# Patient Record
Sex: MALE | Race: BLACK OR AFRICAN AMERICAN | Employment: UNEMPLOYED | URBAN - METROPOLITAN AREA
[De-identification: names, ages, dates, MRNs, and addresses within clinical notes are randomized per-mention and may not be internally consistent; named-entity substitution may affect disease eponyms.]

---

## 2020-05-20 ENCOUNTER — APPOINTMENT (OUTPATIENT)
Dept: PHYSICAL THERAPY | Age: 50
End: 2020-05-20

## 2020-08-19 PROBLEM — K52.9 GASTROENTERITIS: Status: ACTIVE | Noted: 2020-08-19

## 2020-08-19 PROBLEM — R05.9 COUGH: Status: ACTIVE | Noted: 2020-08-19

## 2020-08-19 PROBLEM — R50.9 FEVER: Status: ACTIVE | Noted: 2020-08-19

## 2020-08-19 PROBLEM — R06.02 SOB (SHORTNESS OF BREATH): Status: ACTIVE | Noted: 2020-08-19

## 2020-08-19 PROBLEM — U07.1 COVID-19: Status: ACTIVE | Noted: 2020-08-19

## 2021-09-24 ENCOUNTER — HOSPITAL ENCOUNTER (EMERGENCY)
Age: 51
Discharge: HOME | End: 2021-09-24
Attending: EMERGENCY MEDICINE

## 2021-09-24 VITALS
DIASTOLIC BLOOD PRESSURE: 89 MMHG | TEMPERATURE: 98.1 F | HEART RATE: 86 BPM | OXYGEN SATURATION: 96 % | BODY MASS INDEX: 33.93 KG/M2 | WEIGHT: 237 LBS | RESPIRATION RATE: 16 BRPM | SYSTOLIC BLOOD PRESSURE: 158 MMHG | HEIGHT: 70 IN

## 2021-09-24 DIAGNOSIS — I10 HYPERTENSION, UNSPECIFIED TYPE: Primary | ICD-10-CM

## 2021-09-24 DIAGNOSIS — R51.9 NONINTRACTABLE HEADACHE, UNSPECIFIED CHRONICITY PATTERN, UNSPECIFIED HEADACHE TYPE: ICD-10-CM

## 2021-09-24 LAB
ALBUMIN SERPL-MCNC: 3.7 G/DL (ref 3.5–5)
ALBUMIN/GLOB SERPL: 0.9 {RATIO} (ref 1.2–3.5)
ALP SERPL-CCNC: 101 U/L (ref 50–136)
ALT SERPL-CCNC: 24 U/L (ref 12–65)
ANION GAP SERPL CALC-SCNC: 7 MMOL/L (ref 7–16)
AST SERPL-CCNC: 17 U/L (ref 15–37)
ATRIAL RATE: 73 BPM
BASOPHILS # BLD: 0 K/UL (ref 0–0.2)
BASOPHILS NFR BLD: 1 % (ref 0–2)
BILIRUB SERPL-MCNC: 0.8 MG/DL (ref 0.2–1.1)
BUN SERPL-MCNC: 15 MG/DL (ref 6–23)
CALCIUM SERPL-MCNC: 9.2 MG/DL (ref 8.3–10.4)
CALCULATED P AXIS, ECG09: 28 DEGREES
CALCULATED R AXIS, ECG10: 43 DEGREES
CALCULATED T AXIS, ECG11: 22 DEGREES
CHLORIDE SERPL-SCNC: 107 MMOL/L (ref 98–107)
CO2 SERPL-SCNC: 29 MMOL/L (ref 21–32)
CREAT SERPL-MCNC: 1.08 MG/DL (ref 0.8–1.5)
DIAGNOSIS, 93000: NORMAL
DIFFERENTIAL METHOD BLD: ABNORMAL
EOSINOPHIL # BLD: 0.1 K/UL (ref 0–0.8)
EOSINOPHIL NFR BLD: 1 % (ref 0.5–7.8)
ERYTHROCYTE [DISTWIDTH] IN BLOOD BY AUTOMATED COUNT: 12.4 % (ref 11.9–14.6)
GLOBULIN SER CALC-MCNC: 4.3 G/DL (ref 2.3–3.5)
GLUCOSE SERPL-MCNC: 104 MG/DL (ref 65–100)
HCT VFR BLD AUTO: 37.8 % (ref 41.1–50.3)
HGB BLD-MCNC: 12.5 G/DL (ref 13.6–17.2)
IMM GRANULOCYTES # BLD AUTO: 0 K/UL (ref 0–0.5)
IMM GRANULOCYTES NFR BLD AUTO: 1 % (ref 0–5)
LYMPHOCYTES # BLD: 1.6 K/UL (ref 0.5–4.6)
LYMPHOCYTES NFR BLD: 25 % (ref 13–44)
MCH RBC QN AUTO: 31.6 PG (ref 26.1–32.9)
MCHC RBC AUTO-ENTMCNC: 33.1 G/DL (ref 31.4–35)
MCV RBC AUTO: 95.5 FL (ref 79.6–97.8)
MONOCYTES # BLD: 0.5 K/UL (ref 0.1–1.3)
MONOCYTES NFR BLD: 7 % (ref 4–12)
NEUTS SEG # BLD: 4.3 K/UL (ref 1.7–8.2)
NEUTS SEG NFR BLD: 66 % (ref 43–78)
NRBC # BLD: 0 K/UL (ref 0–0.2)
P-R INTERVAL, ECG05: 132 MS
PLATELET # BLD AUTO: 249 K/UL (ref 150–450)
PMV BLD AUTO: 11.5 FL (ref 9.4–12.3)
POTASSIUM SERPL-SCNC: 3.4 MMOL/L (ref 3.5–5.1)
PROT SERPL-MCNC: 8 G/DL (ref 6.3–8.2)
Q-T INTERVAL, ECG07: 372 MS
QRS DURATION, ECG06: 88 MS
QTC CALCULATION (BEZET), ECG08: 409 MS
RBC # BLD AUTO: 3.96 M/UL (ref 4.23–5.6)
SODIUM SERPL-SCNC: 143 MMOL/L (ref 136–145)
VENTRICULAR RATE, ECG03: 73 BPM
WBC # BLD AUTO: 6.5 K/UL (ref 4.3–11.1)

## 2021-09-24 PROCEDURE — 74011250636 HC RX REV CODE- 250/636: Performed by: EMERGENCY MEDICINE

## 2021-09-24 PROCEDURE — 96374 THER/PROPH/DIAG INJ IV PUSH: CPT

## 2021-09-24 PROCEDURE — 93005 ELECTROCARDIOGRAM TRACING: CPT | Performed by: EMERGENCY MEDICINE

## 2021-09-24 PROCEDURE — 80053 COMPREHEN METABOLIC PANEL: CPT

## 2021-09-24 PROCEDURE — 74011000250 HC RX REV CODE- 250: Performed by: EMERGENCY MEDICINE

## 2021-09-24 PROCEDURE — 99285 EMERGENCY DEPT VISIT HI MDM: CPT

## 2021-09-24 PROCEDURE — 96375 TX/PRO/DX INJ NEW DRUG ADDON: CPT

## 2021-09-24 PROCEDURE — 85025 COMPLETE CBC W/AUTO DIFF WBC: CPT

## 2021-09-24 RX ORDER — SODIUM CHLORIDE 0.9 % (FLUSH) 0.9 %
5-40 SYRINGE (ML) INJECTION EVERY 8 HOURS
Status: DISCONTINUED | OUTPATIENT
Start: 2021-09-24 | End: 2021-09-24 | Stop reason: HOSPADM

## 2021-09-24 RX ORDER — SODIUM CHLORIDE 0.9 % (FLUSH) 0.9 %
5-40 SYRINGE (ML) INJECTION AS NEEDED
Status: DISCONTINUED | OUTPATIENT
Start: 2021-09-24 | End: 2021-09-24 | Stop reason: HOSPADM

## 2021-09-24 RX ORDER — LABETALOL HYDROCHLORIDE 5 MG/ML
20 INJECTION, SOLUTION INTRAVENOUS
Status: COMPLETED | OUTPATIENT
Start: 2021-09-24 | End: 2021-09-24

## 2021-09-24 RX ORDER — HYDRALAZINE HYDROCHLORIDE 20 MG/ML
10 INJECTION INTRAMUSCULAR; INTRAVENOUS
Status: COMPLETED | OUTPATIENT
Start: 2021-09-24 | End: 2021-09-24

## 2021-09-24 RX ORDER — HYDRALAZINE HYDROCHLORIDE 20 MG/ML
10 INJECTION INTRAMUSCULAR; INTRAVENOUS
Status: DISCONTINUED | OUTPATIENT
Start: 2021-09-24 | End: 2021-09-24

## 2021-09-24 RX ORDER — LOSARTAN POTASSIUM 50 MG/1
50 TABLET ORAL DAILY
Qty: 30 TABLET | Refills: 0 | Status: SHIPPED | OUTPATIENT
Start: 2021-09-24 | End: 2021-10-08 | Stop reason: SDUPTHER

## 2021-09-24 RX ADMIN — LABETALOL HYDROCHLORIDE 20 MG: 5 INJECTION INTRAVENOUS at 12:59

## 2021-09-24 RX ADMIN — HYDRALAZINE HYDROCHLORIDE 10 MG: 20 INJECTION INTRAMUSCULAR; INTRAVENOUS at 14:18

## 2021-09-24 RX ADMIN — Medication 5 ML: at 13:00

## 2021-09-24 NOTE — ED PROVIDER NOTES
28-year-old -American male with no history of hypertension presents with intermittent headache over the past 2 to 3 weeks. Headache is frontal and bilateral.  Described as throbbing. No aggravating or alleviating factors. No chest pain, vision changes, numbness or weakness. Denies nausea and vomiting. No fever or neck pain. The history is provided by the patient. Headache   Pertinent negatives include no fever, no shortness of breath, no nausea and no vomiting. Past Medical History:   Diagnosis Date    Hypertension        History reviewed. No pertinent surgical history. History reviewed. No pertinent family history. Social History     Socioeconomic History    Marital status:      Spouse name: Not on file    Number of children: Not on file    Years of education: Not on file    Highest education level: Not on file   Occupational History    Not on file   Tobacco Use    Smoking status: Never Smoker    Smokeless tobacco: Never Used   Substance and Sexual Activity    Alcohol use: No    Drug use: No    Sexual activity: Not on file   Other Topics Concern    Not on file   Social History Narrative    Not on file     Social Determinants of Health     Financial Resource Strain:     Difficulty of Paying Living Expenses:    Food Insecurity:     Worried About Running Out of Food in the Last Year:     [address] in the Last Year:    Transportation Needs:     Lack of Transportation (Medical):      Lack of Transportation (Non-Medical):    Physical Activity:     Days of Exercise per Week:     Minutes of Exercise per Session:    Stress:     Feeling of Stress :    Social Connections:     Frequency of Communication with Friends and Family:     Frequency of Social Gatherings with Friends and Family:     Attends Jew Services:     Active Member of Clubs or Organizations:     Attends Club or Organization Meetings:     Marital Status:    Intimate Partner Violence:  Fear of Current or Ex-Partner:     Emotionally Abused:     Physically Abused:     Sexually Abused: ALLERGIES: Patient has no known allergies. Review of Systems   Constitutional: Negative for fever. HENT: Negative for congestion. Respiratory: Negative for cough and shortness of breath. Cardiovascular: Negative for chest pain. Gastrointestinal: Negative for abdominal pain, nausea and vomiting. Musculoskeletal: Negative for back pain and neck pain. Skin: Negative for rash. Neurological: Positive for headaches. All other systems reviewed and are negative. Vitals:    09/24/21 1213 09/24/21 1217 09/24/21 1222   BP: (!) 197/123 (!) 180/120 (!) 190/113   Pulse: 89  82   Resp: 16     Temp: 98.1 °F (36.7 °C)     SpO2: 99%  95%   Weight: 107.5 kg (237 lb)     Height: 5' 10\" (1.778 m)              Physical Exam  Vitals and nursing note reviewed. Constitutional:       General: He is not in acute distress. Appearance: Normal appearance. He is not toxic-appearing. HENT:      Head: Normocephalic and atraumatic. Nose: Nose normal.      Mouth/Throat:      Mouth: Mucous membranes are moist.      Pharynx: Oropharynx is clear. Eyes:      Extraocular Movements: Extraocular movements intact. Conjunctiva/sclera: Conjunctivae normal.      Pupils: Pupils are equal, round, and reactive to light. Cardiovascular:      Rate and Rhythm: Normal rate and regular rhythm. Heart sounds: No murmur heard. Pulmonary:      Effort: Pulmonary effort is normal.      Breath sounds: Normal breath sounds. Abdominal:      General: There is no distension. Palpations: Abdomen is soft. Tenderness: There is no abdominal tenderness. There is no guarding. Musculoskeletal:         General: No swelling or tenderness. Normal range of motion. Cervical back: Normal range of motion and neck supple. Skin:     General: Skin is dry.    Neurological:      General: No focal deficit present. Mental Status: He is alert and oriented to person, place, and time. Cranial Nerves: No cranial nerve deficit. Sensory: No sensory deficit. Motor: No weakness. Coordination: Coordination normal.   Psychiatric:         Mood and Affect: Mood normal.         Behavior: Behavior normal.          MDM  Number of Diagnoses or Management Options  Hypertension, unspecified type  Nonintractable headache, unspecified chronicity pattern, unspecified headache type  Diagnosis management comments: EKG normal sinus rhythm rate 73 no diagnostic ST changes. Lab work is unremarkable. Patient treated initially with labetalol with temporary improvement in blood pressure. Headache also improved. Blood pressure again increased and was treated with hydralazine. Blood pressure again has improved. Patient has normal neuro exam and headache improved with blood pressure control therefore I do not feel imaging is necessary. Patient is comfortable and appears safe for discharge home. Will start on losartan for blood pressure. Patient has a list of physicians to follow-up with for primary care.        Amount and/or Complexity of Data Reviewed  Clinical lab tests: ordered and reviewed  Tests in the medicine section of CPT®: ordered and reviewed    Risk of Complications, Morbidity, and/or Mortality  Presenting problems: moderate  Diagnostic procedures: moderate  Management options: moderate           EKG    Date/Time: 9/24/2021 1:32 PM  Performed by: Humble Olivo MD  Authorized by: Humble Olivo MD     ECG reviewed by ED Physician in the absence of a cardiologist: yes    Previous ECG:     Previous ECG:  Unavailable  Interpretation:     Interpretation: abnormal    Rate:     ECG rate assessment: normal    Rhythm:     Rhythm: sinus rhythm    Ectopy:     Ectopy: none    Conduction:     Conduction: normal    ST segments:     ST segments:  Normal  T waves:     T waves: non-specific

## 2021-09-24 NOTE — ED NOTES
I have reviewed discharge instructions with the patient. The patient verbalized understanding. Patient left ED via Discharge Method: ambulatory to Home with self    Opportunity for questions and clarification provided. Patient given 1 scripts. To continue your aftercare when you leave the hospital, you may receive an automated call from our care team to check in on how you are doing. This is a free service and part of our promise to provide the best care and service to meet your aftercare needs.  If you have questions, or wish to unsubscribe from this service please call [phone]. Thank you for Choosing our [facility] Emergency Department.

## 2021-09-24 NOTE — PROGRESS NOTES
Visited with patient as no PCP listed in chart. Demographics on face sheet verified. Patient states no PCP and no insurance. Explained the Access [facility] program, [facility] and [facility] in detail and provided patient with resources. Contact information for [facility] given to patient and patient encouraged to follow up with her as soon as possible. Patient also given direct contact information for the [facility] CLINICS representative at [facility] and encouraged to call to get screened for financial assistance.

## 2021-09-24 NOTE — ED NOTES
No known issues with htn, has had headaches for last several weeks. Multiple BP reading in triage 190s/120s. Has taken ibuprofen at home for headaches with no improvement. Patient evaluated initially in triage. Rapid Medical Evaluation was conducted and necessary orders have been placed. I have performed a medical screening exam.  Care will now be transferred to the attending physician in the emergency department.   RAYA Reynoso 12:14 PM

## 2021-10-08 ENCOUNTER — HOSPITAL ENCOUNTER (EMERGENCY)
Age: 51
Discharge: HOME | End: 2021-10-08
Attending: EMERGENCY MEDICINE

## 2021-10-08 VITALS
HEART RATE: 80 BPM | WEIGHT: 239 LBS | HEIGHT: 72 IN | DIASTOLIC BLOOD PRESSURE: 103 MMHG | OXYGEN SATURATION: 96 % | SYSTOLIC BLOOD PRESSURE: 180 MMHG | RESPIRATION RATE: 16 BRPM | TEMPERATURE: 98.6 F | BODY MASS INDEX: 32.37 KG/M2

## 2021-10-08 DIAGNOSIS — I10 ESSENTIAL HYPERTENSION: Primary | ICD-10-CM

## 2021-10-08 PROCEDURE — 99282 EMERGENCY DEPT VISIT SF MDM: CPT

## 2021-10-08 RX ORDER — LOSARTAN POTASSIUM 100 MG/1
100 TABLET ORAL DAILY
Qty: 30 TABLET | Refills: 0 | Status: SHIPPED | OUTPATIENT
Start: 2021-10-08 | End: 2021-11-07

## 2021-10-08 NOTE — ED TRIAGE NOTES
Pt in [facility] was started on losartan 50mg 2 weeks ago. [facility] BP is still elevated. Denies n/v/d/cp/sob. [facility] has pcp appointment on 10/24/21.

## 2021-10-08 NOTE — ED PROVIDER NOTES
56-year-old male presents emergency department today with complaint of hypertension. Patient states that he was seen here on the 24th and prescribed losartan. He reports he has been compliant with medication but he is still having blood pressures of 100 systolic at home. He denies any chest pain, shortness of breath, dizziness, headache, blurred vision, or ankle swelling. The history is provided by the patient. Hypertension   This is a new problem. Pertinent negatives include no chest pain, no blurred vision, no headaches, no peripheral edema, no dizziness and no shortness of breath. Risk factors include obesity. Past Medical History:   Diagnosis Date    Hypertension        History reviewed. No pertinent surgical history. History reviewed. No pertinent family history. Social History     Socioeconomic History    Marital status:      Spouse name: Not on file    Number of children: Not on file    Years of education: Not on file    Highest education level: Not on file   Occupational History    Not on file   Tobacco Use    Smoking status: Never Smoker    Smokeless tobacco: Never Used   Substance and Sexual Activity    Alcohol use: No    Drug use: No    Sexual activity: Not on file   Other Topics Concern    Not on file   Social History Narrative    Not on file     Social Determinants of Health     Financial Resource Strain:     Difficulty of Paying Living Expenses:    Food Insecurity:     Worried About Running Out of Food in the Last Year:     [address] in the Last Year:    Transportation Needs:     Lack of Transportation (Medical):      Lack of Transportation (Non-Medical):    Physical Activity:     Days of Exercise per Week:     Minutes of Exercise per Session:    Stress:     Feeling of Stress :    Social Connections:     Frequency of Communication with Friends and Family:     Frequency of Social Gatherings with Friends and Family:     Attends Quaker Services:     Active Member of Clubs or Organizations:     Attends Club or Organization Meetings:     Marital Status:    Intimate Partner Violence:     Fear of Current or Ex-Partner:     Emotionally Abused:     Physically Abused:     Sexually Abused: ALLERGIES: Patient has no known allergies. Review of Systems   Constitutional: Negative for fatigue. Eyes: Negative for blurred vision. Respiratory: Negative for shortness of breath. Cardiovascular: Negative for chest pain. Neurological: Negative for dizziness and headaches. All other systems reviewed and are negative. Vitals:    10/08/21 1650   BP: (!) 180/103   Pulse: 80   Resp: 16   Temp: 98.6 °F (37 °C)   SpO2: 96%   Weight: 108.4 kg (239 lb)   Height: 6' (1.829 m)            Physical Exam  Vitals and nursing note reviewed. Constitutional:       General: He is not in acute distress. Appearance: Normal appearance. He is obese. He is not ill-appearing, toxic-appearing or diaphoretic. HENT:      Head: Normocephalic and atraumatic. Right Ear: External ear normal.      Left Ear: External ear normal.      Mouth/Throat:      Mouth: Mucous membranes are moist.      Pharynx: Oropharynx is clear. Eyes:      General: No scleral icterus. Extraocular Movements: Extraocular movements intact. Conjunctiva/sclera: Conjunctivae normal.   Cardiovascular:      Rate and Rhythm: Normal rate. Pulses: Normal pulses. Heart sounds: Normal heart sounds. Pulmonary:      Effort: Pulmonary effort is normal. No respiratory distress. Breath sounds: Normal breath sounds. Abdominal:      General: Abdomen is flat. There is no distension. Musculoskeletal:         General: Normal range of motion. Cervical back: Normal range of motion and neck supple. No rigidity. Right lower leg: No edema. Left lower leg: No edema. Skin:     General: Skin is warm and dry.       Capillary Refill: Capillary refill takes less than 2 seconds. Neurological:      General: No focal deficit present. Mental Status: He is alert and oriented to person, place, and time. Psychiatric:         Mood and Affect: Mood normal.         Behavior: Behavior normal.         Thought Content: Thought content normal.         Judgment: Judgment normal.          MDM  Number of Diagnoses or Management Options  Essential hypertension  Diagnosis management comments: 60-year-old male presents emergency department today with chief complaint of hypertension. Patient has been monitoring his blood pressure at home since being prescribed losartan on September 24. Patient reports he is continue to have blood pressures of about 180/110. Patient denies any associated symptoms. He is well-appearing on exam.  Patient had normal labs on September 24. No work-up indicated today. We will increase his losartan to 100 mg. Patient states he has a primary care appointment at the end of the month. I encouraged him to continue monitoring his blood pressure at home, improve his diet, exercise, and reduce sodium intake. I have discussed the results of all labs, procedures, radiographs, and/or treatments with the patient and available family members. Bryan De La Cruz is agreed upon by the patient and the patient is ready for discharge.  Questions about treatment in the ED and differential diagnosis of presenting condition were answered. Saumya Kaufman was given verbal discharge instructions including, but not limited to, importance of returning to the emergency department for any concern of worsening or continued symptoms.  Instructions were given to follow up with a primary care provider or specialist within 1-2 days. Remus Channel effects of medications, if prescribed, were discussed and patient was advised to refrain from significant physical activity until followed up by primary care physician and to not drive or operate heavy machinery after taking any sedating substances.      Sarah Mensah NP; 10/8/2021 @9:06 PM Voice dictation software was used during the making of  this note. This software is not perfect and grammatical and other typographical errors  may be present. This note has not been proofread for errors. Risk of Complications, Morbidity, and/or Mortality  Presenting problems: low  Diagnostic procedures: low  Management options: low    Patient Progress  Patient progress: improved    ED Course as of Oct 08 1701   Fri Oct 08, 2021   1700 Case discussed with Dr. Sourav Dumont. Suggest to increase patient's losartan dose and have him follow-up with primary care.     [BC]      ED Course User Index  [BC] Judah Horta NP       Procedures

## 2021-10-08 NOTE — ED NOTES
I have reviewed discharge instructions with the patient. The patient verbalized understanding. Patient left ED via Discharge Method: ambulatory to Home with self. Opportunity for questions and clarification provided. Patient given 1 scripts. To continue your aftercare when you leave the hospital, you may receive an automated call from our care team to check in on how you are doing. This is a free service and part of our promise to provide the best care and service to meet your aftercare needs.  If you have questions, or wish to unsubscribe from this service please call [phone]. Thank you for Choosing our [facility] Emergency Department.

## 2021-10-08 NOTE — DISCHARGE INSTRUCTIONS
Take medication as prescribed. Continue monitoring your blood pressure at home. Reduce sodium intake. Return to the ER for any new, worsening, or concerning symptoms.

## 2022-03-18 PROBLEM — R05.9 COUGH: Status: ACTIVE | Noted: 2020-08-19

## 2022-03-18 PROBLEM — R50.9 FEVER: Status: ACTIVE | Noted: 2020-08-19

## 2022-03-19 PROBLEM — R06.02 SOB (SHORTNESS OF BREATH): Status: ACTIVE | Noted: 2020-08-19

## 2022-03-19 PROBLEM — K52.9 GASTROENTERITIS: Status: ACTIVE | Noted: 2020-08-19

## 2022-03-19 PROBLEM — U07.1 COVID-19: Status: ACTIVE | Noted: 2020-08-19

## 2024-04-01 ENCOUNTER — APPOINTMENT (OUTPATIENT)
Dept: GENERAL RADIOLOGY | Age: 54
End: 2024-04-01
Payer: COMMERCIAL

## 2024-04-01 ENCOUNTER — TELEPHONE (OUTPATIENT)
Dept: ORTHOPEDIC SURGERY | Age: 54
End: 2024-04-01

## 2024-04-01 ENCOUNTER — HOSPITAL ENCOUNTER (EMERGENCY)
Age: 54
Discharge: HOME | End: 2024-04-01
Attending: STUDENT IN AN ORGANIZED HEALTH CARE EDUCATION/TRAINING PROGRAM
Payer: COMMERCIAL

## 2024-04-01 VITALS
WEIGHT: 240 LBS | TEMPERATURE: 98.1 F | OXYGEN SATURATION: 95 % | BODY MASS INDEX: 32.51 KG/M2 | HEIGHT: 72 IN | SYSTOLIC BLOOD PRESSURE: 150 MMHG | DIASTOLIC BLOOD PRESSURE: 74 MMHG | RESPIRATION RATE: 17 BRPM | HEART RATE: 95 BPM

## 2024-04-01 DIAGNOSIS — S62.616A CLOSED DISPLACED FRACTURE OF PROXIMAL PHALANX OF RIGHT LITTLE FINGER, INITIAL ENCOUNTER: ICD-10-CM

## 2024-04-01 DIAGNOSIS — S62.316A DISPLACED FRACTURE OF BASE OF FIFTH METACARPAL BONE, RIGHT HAND, INITIAL ENCOUNTER FOR CLOSED FRACTURE: Primary | ICD-10-CM

## 2024-04-01 PROCEDURE — 73090 X-RAY EXAM OF FOREARM: CPT

## 2024-04-01 PROCEDURE — 73130 X-RAY EXAM OF HAND: CPT

## 2024-04-01 PROCEDURE — 6360000002 HC RX W HCPCS: Performed by: STUDENT IN AN ORGANIZED HEALTH CARE EDUCATION/TRAINING PROGRAM

## 2024-04-01 PROCEDURE — 6370000000 HC RX 637 (ALT 250 FOR IP): Performed by: STUDENT IN AN ORGANIZED HEALTH CARE EDUCATION/TRAINING PROGRAM

## 2024-04-01 PROCEDURE — 90714 TD VACC NO PRESV 7 YRS+ IM: CPT | Performed by: STUDENT IN AN ORGANIZED HEALTH CARE EDUCATION/TRAINING PROGRAM

## 2024-04-01 PROCEDURE — 90471 IMMUNIZATION ADMIN: CPT | Performed by: STUDENT IN AN ORGANIZED HEALTH CARE EDUCATION/TRAINING PROGRAM

## 2024-04-01 PROCEDURE — 29125 APPL SHORT ARM SPLINT STATIC: CPT

## 2024-04-01 PROCEDURE — 99284 EMERGENCY DEPT VISIT MOD MDM: CPT

## 2024-04-01 PROCEDURE — 2500000003 HC RX 250 WO HCPCS: Performed by: STUDENT IN AN ORGANIZED HEALTH CARE EDUCATION/TRAINING PROGRAM

## 2024-04-01 RX ORDER — TETANUS AND DIPHTHERIA TOXOIDS ADSORBED 2; 2 [LF]/.5ML; [LF]/.5ML
0.5 INJECTION INTRAMUSCULAR
Status: COMPLETED | OUTPATIENT
Start: 2024-04-01 | End: 2024-04-01

## 2024-04-01 RX ORDER — LIDOCAINE HYDROCHLORIDE 10 MG/ML
5 INJECTION, SOLUTION INFILTRATION; PERINEURAL ONCE
Status: COMPLETED | OUTPATIENT
Start: 2024-04-01 | End: 2024-04-01

## 2024-04-01 RX ORDER — OXYCODONE HYDROCHLORIDE AND ACETAMINOPHEN 5; 325 MG/1; MG/1
1 TABLET ORAL EVERY 6 HOURS PRN
Qty: 12 TABLET | Refills: 0 | Status: SHIPPED | OUTPATIENT
Start: 2024-04-01 | End: 2024-04-04

## 2024-04-01 RX ORDER — OXYCODONE HYDROCHLORIDE 5 MG/1
5 TABLET ORAL
Status: COMPLETED | OUTPATIENT
Start: 2024-04-01 | End: 2024-04-01

## 2024-04-01 RX ADMIN — OXYCODONE 5 MG: 5 TABLET ORAL at 04:33

## 2024-04-01 RX ADMIN — TETANUS AND DIPHTHERIA TOXOIDS ADSORBED 0.5 ML: 2; 2 INJECTION INTRAMUSCULAR at 04:33

## 2024-04-01 RX ADMIN — LIDOCAINE HYDROCHLORIDE 5 ML: 10 INJECTION, SOLUTION EPIDURAL; INFILTRATION; INTRACAUDAL; PERINEURAL at 04:54

## 2024-04-01 ASSESSMENT — PAIN SCALES - GENERAL
PAINLEVEL_OUTOF10: 4
PAINLEVEL_OUTOF10: 10

## 2024-04-01 ASSESSMENT — PAIN - FUNCTIONAL ASSESSMENT: PAIN_FUNCTIONAL_ASSESSMENT: 0-10

## 2024-04-01 NOTE — TELEPHONE ENCOUNTER
I called to schedule ER follow up with Chloe Madera Np for hand and finger fracture. No answer, I left a voicemail for patient to call back.

## 2024-04-01 NOTE — ED PROVIDER NOTES
assessment: post-procedure neurovascularly intact  Patient tolerance: patient tolerated the procedure well with no immediate complications        MDM   Labs Reviewed - No data to display  Medications   oxyCODONE (ROXICODONE) immediate release tablet 5 mg (5 mg Oral Given 4/1/24 6133)   diptheria-tetanus toxoids (TDVAX) 2-2 LF/0.5ML injection 0.5 mL (0.5 mLs IntraMUSCular Given 4/1/24 1743)   lidocaine 1 % injection 5 mL (5 mLs IntraDERmal Given 4/1/24 9959)     XR HAND RIGHT (MIN 3 VIEWS)   Final Result   Spiral fracture of the fifth metacarpal. Comminuted fracture of the   diaphysis of the fifth proximal phalanx with angulation.         XR RADIUS ULNA RIGHT (2 VIEWS)   Final Result   No fracture or dislocation.      Hypertrophic changes at the olecranon.      Anterior forearm soft tissue swelling.         XR HAND RIGHT (MIN 3 VIEWS)    (Results Pending)     Voice dictation software was used during the making of this note.  This software is not perfect and grammatical and other typographical errors may be present.  This note has not been completely proofread for errors.     Nakul Lassiter MD  04/01/24 7371

## 2024-04-01 NOTE — DISCHARGE INSTRUCTIONS
Apply ice wrapped in a towel to the hand for 20 minutes every few hours. Keep the hand elevated when possible. You will need to follow up with the Hand Surgery clinic. Call their office this morning to set up an appointment. Return to the ER if any new or worsening symptoms.

## 2024-04-01 NOTE — ED TRIAGE NOTES
Pt presents to the ER with steady gait.  Pt accompianed by spouse.  Pt and/or family reports right hand pain and swelling after a trailer landed on it just PTA.  CMS intact, pt with significant swelling noted.

## 2024-04-02 ENCOUNTER — OFFICE VISIT (OUTPATIENT)
Dept: ORTHOPEDIC SURGERY | Age: 54
End: 2024-04-02

## 2024-04-02 DIAGNOSIS — S62.316A DISPLACED FRACTURE OF BASE OF FIFTH METACARPAL BONE, RIGHT HAND, INITIAL ENCOUNTER FOR CLOSED FRACTURE: Primary | ICD-10-CM

## 2024-04-02 DIAGNOSIS — S62.616A CLOSED DISPLACED FRACTURE OF PROXIMAL PHALANX OF RIGHT LITTLE FINGER, INITIAL ENCOUNTER: ICD-10-CM

## 2024-04-02 DIAGNOSIS — S62.617A DISPLACED FRACTURE OF PROXIMAL PHALANX OF LEFT LITTLE FINGER, INITIAL ENCOUNTER FOR CLOSED FRACTURE: ICD-10-CM

## 2024-04-02 PROCEDURE — 99204 OFFICE O/P NEW MOD 45 MIN: CPT | Performed by: NURSE PRACTITIONER

## 2024-04-02 NOTE — PROGRESS NOTES
Patient was prescribed a Boxer Splint for the right hand. The top portion of the boxer splint is bent slightly to an angle that allows fingers to be in a relaxed position. The straps are opened to allow the hand to rest in the splint with the fourth and fifth fingers in the top portion of the splint. The top portion strap is secured around the fourth and fifth fingers. The strap around the wrist is then secured followed by the middle strap that is secured between the first finger and thumbPatient read and signed documenting they understand and agree to [facility]'s current DME return policy.

## 2024-04-02 NOTE — PROGRESS NOTES
Orthopaedic Hand Clinic Note    Name: Messi Dumont  YOB: 1970  Gender: male  MRN: [id number]      CC: Patient referred for evaluation of right hand pain    HPI: Messi Dumont is a 53 y.o. male right hand dominant with a chief complaint of right hand pain.  He is a  for [employer].  He reports yesterday April 1, 2024 at approximately 3 AM he was lowering his trailer.  He said the handle spun out of control hitting his right hand.  He was seen at [facility] emergency room.  He was x-rayed.  He underwent a reduction of the right small finger.  He was emily taped and placed in an ulnar gutter splint.  He is taking oxycodone for pain.  He is accompanied by his wife.  He reports a medical history positive for hypertension..    ROS/Meds/PSH/PMH/FH/SH: I personally reviewed the patients standard intake form.  Pertinents are discussed in the HPI    Physical Examination:  General: Awake and alert.  HEENT: Normocephalic, atraumatic  CV/Pulm: Breathing even and unlabored  Skin: No obvious rashes noted.  Lymphatic: No obvious evidence of lymphedema or lymphadenopathy    Musculoskeletal Exam:  Examination on the right upper extremity demonstrates cap refill < 5 seconds in all fingers  Patient has swelling and ecchymosis to the right hand.  His ring and small finger are emily taped with medical tape.  He has an abrasion to the ulnar aspect of his right forearm.  He has decreased range of motion secondary to pain.  He is tender palpation over the fifth metacarpal and the right small finger proximal phalanx.  He denies paresthesia.    Dr. Temple has examined the patient.    Imaging / Electrodiagnostic Tests:     X-rays include a 3 view right hand are independently reviewed and interpreted.  Patient has a fracture of the right fifth metacarpal.  He has a fracture of the proximal phalanx of the right small finger.    Dr. Temple has reviewed xrays.    Assessment:   1. Displaced fracture of base of

## 2024-04-02 NOTE — H&P (VIEW-ONLY)
Orthopaedic Hand Clinic Note    Name: Messi Dumont  YOB: 1970  Gender: male  MRN: [id number]      CC: Patient referred for evaluation of right hand pain    HPI: Messi Dumont is a 53 y.o. male right hand dominant with a chief complaint of right hand pain.  He is a  for country [employer].  He reports yesterday April 1, 2024 at approximately 3 AM he was lowering his trailer.  He said the handle spun out of control hitting his right hand.  He was seen at [facility] emergency room.  He was x-rayed.  He underwent a reduction of the right small finger.  He was emily taped and placed in an ulnar gutter splint.  He is taking oxycodone for pain.  He is accompanied by his wife.  He reports a medical history positive for hypertension..    ROS/Meds/PSH/PMH/FH/SH: I personally reviewed the patients standard intake form.  Pertinents are discussed in the HPI    Physical Examination:  General: Awake and alert.  HEENT: Normocephalic, atraumatic  CV/Pulm: Breathing even and unlabored  Skin: No obvious rashes noted.  Lymphatic: No obvious evidence of lymphedema or lymphadenopathy    Musculoskeletal Exam:  Examination on the right upper extremity demonstrates cap refill < 5 seconds in all fingers  Patient has swelling and ecchymosis to the right hand.  His ring and small finger are emily taped with medical tape.  He has an abrasion to the ulnar aspect of his right forearm.  He has decreased range of motion secondary to pain.  He is tender palpation over the fifth metacarpal and the right small finger proximal phalanx.  He denies paresthesia.    Dr. Temple has examined the patient.    Imaging / Electrodiagnostic Tests:     X-rays include a 3 view right hand are independently reviewed and interpreted.  Patient has a fracture of the right fifth metacarpal.  He has a fracture of the proximal phalanx of the right small finger.    Dr. Temple has reviewed xrays.    Assessment:   1. Displaced fracture of base of

## 2024-04-03 ENCOUNTER — ANESTHESIA EVENT (OUTPATIENT)
Dept: SURGERY | Age: 54
End: 2024-04-03
Payer: COMMERCIAL

## 2024-04-03 DIAGNOSIS — S62.316A DISPLACED FRACTURE OF BASE OF FIFTH METACARPAL BONE, RIGHT HAND, INITIAL ENCOUNTER FOR CLOSED FRACTURE: Primary | ICD-10-CM

## 2024-04-03 DIAGNOSIS — S62.616A CLOSED DISPLACED FRACTURE OF PROXIMAL PHALANX OF RIGHT LITTLE FINGER, INITIAL ENCOUNTER: ICD-10-CM

## 2024-04-03 RX ORDER — AMLODIPINE BESYLATE 10 MG/1
10 TABLET ORAL DAILY
COMMUNITY
Start: 2024-02-14

## 2024-04-03 RX ORDER — LOSARTAN POTASSIUM AND HYDROCHLOROTHIAZIDE 25; 100 MG/1; MG/1
TABLET ORAL DAILY
COMMUNITY
Start: 2024-02-14

## 2024-04-03 RX ORDER — TIRZEPATIDE 2.5 MG/.5ML
INJECTION, SOLUTION SUBCUTANEOUS WEEKLY
COMMUNITY
Start: 2024-02-14

## 2024-04-03 RX ORDER — ERGOCALCIFEROL 1.25 MG/1
CAPSULE ORAL WEEKLY
COMMUNITY
Start: 2024-02-16

## 2024-04-03 RX ORDER — TADALAFIL 5 MG/1
TABLET ORAL PRN
COMMUNITY
Start: 2024-03-29

## 2024-04-03 NOTE — PERIOP NOTE
Patient verified name and .  Order for consent NOT found in EHR at time of PAT visit. Unable to verify case posting against order; surgery verified by patient.    Type 1B surgery, phone assessment complete.  Orders not received.  Labs per surgeon: none ordered  Labs per anesthesia protocol: K+, SQBS s/h for DOS    Patient answered medical/surgical history questions at their best of ability. All prior to admission medications documented in EPIC.    Patient instructed to continue taking all prescription medications up to the day of surgery but to take only the following medications the day of surgery according to anesthesia guidelines with a small sip of water: oxycodone-acetaminophen if needed- patient states he is taking, amlodipine.  Patient informed that all vitamins and supplements should be held 7 days prior to surgery and NSAIDS 5 days prior to surgery. Prescription meds to hold: mounjaro now for surgery, last dose 3/24/24, hold cialis now for surgery    Patient instructed on the following:  > Patient is instructed to have clear liquids only on the day prior to procedure and to be NPO after midnight, unless otherwise indicated, including gum, mints, and ice chips.   > Arrive at [facility] Entrance, time of arrival to be called the day before by 1700  > NPO after midnight, unless otherwise indicated, including gum, mints, and ice chips  > Responsible adult must drive patient to the hospital, stay during surgery, and patient will need supervision 24 hours after anesthesia  > Use non moisturizing soap in shower the night before surgery and on the morning of surgery  > All piercings must be removed prior to arrival.    > Leave all valuables (money and jewelry) at home but bring insurance card and ID on DOS.   > You may be required to pay a deductible or co-pay on the day of your procedure. You can pre-pay by calling [phone] if your surgery is at the [facility] or [phone] if your surgery is at the [facility]

## 2024-04-03 NOTE — PERIOP NOTE
Preop department called to notify patient of arrival time for scheduled procedure. Instructions given to   - Arrive at [facility] Entrance [address].  - Remain NPO after midnight, unless otherwise indicated, including gum, mints, and ice chips.   - Have a responsible adult to drive patient to the hospital, stay during surgery, and patient will need supervision 24 hours after anesthesia.   - Use antibacterial soap in shower the night before surgery and on the morning of surgery.       Was patient contacted: yes wife  Voicemail left: yes-pts phone   Numbers contacted: [phone]   Arrival time: 1000

## 2024-04-04 ENCOUNTER — HOSPITAL ENCOUNTER (OUTPATIENT)
Age: 54
Setting detail: OUTPATIENT SURGERY
Discharge: HOME | End: 2024-04-04
Attending: ORTHOPAEDIC SURGERY | Admitting: ORTHOPAEDIC SURGERY
Payer: COMMERCIAL

## 2024-04-04 ENCOUNTER — APPOINTMENT (OUTPATIENT)
Dept: GENERAL RADIOLOGY | Age: 54
End: 2024-04-04
Attending: ORTHOPAEDIC SURGERY
Payer: COMMERCIAL

## 2024-04-04 ENCOUNTER — ANESTHESIA (OUTPATIENT)
Dept: SURGERY | Age: 54
End: 2024-04-04
Payer: COMMERCIAL

## 2024-04-04 VITALS
OXYGEN SATURATION: 97 % | DIASTOLIC BLOOD PRESSURE: 87 MMHG | WEIGHT: 240 LBS | BODY MASS INDEX: 32.51 KG/M2 | RESPIRATION RATE: 21 BRPM | SYSTOLIC BLOOD PRESSURE: 149 MMHG | HEIGHT: 72 IN | TEMPERATURE: 97.6 F | HEART RATE: 76 BPM

## 2024-04-04 LAB — POTASSIUM BLD-SCNC: 3.5 MMOL/L (ref 3.5–5.1)

## 2024-04-04 PROCEDURE — 2709999900 HC NON-CHARGEABLE SUPPLY: Performed by: ORTHOPAEDIC SURGERY

## 2024-04-04 PROCEDURE — 6360000002 HC RX W HCPCS: Performed by: NURSE ANESTHETIST, CERTIFIED REGISTERED

## 2024-04-04 PROCEDURE — 7100000011 HC PHASE II RECOVERY - ADDTL 15 MIN: Performed by: ORTHOPAEDIC SURGERY

## 2024-04-04 PROCEDURE — 6360000002 HC RX W HCPCS: Performed by: ANESTHESIOLOGY

## 2024-04-04 PROCEDURE — C1769 GUIDE WIRE: HCPCS | Performed by: ORTHOPAEDIC SURGERY

## 2024-04-04 PROCEDURE — 7100000001 HC PACU RECOVERY - ADDTL 15 MIN: Performed by: ORTHOPAEDIC SURGERY

## 2024-04-04 PROCEDURE — 3600000004 HC SURGERY LEVEL 4 BASE: Performed by: ORTHOPAEDIC SURGERY

## 2024-04-04 PROCEDURE — 6360000002 HC RX W HCPCS: Performed by: NURSE PRACTITIONER

## 2024-04-04 PROCEDURE — 26735 TREAT FINGER FRACTURE EACH: CPT | Performed by: ORTHOPAEDIC SURGERY

## 2024-04-04 PROCEDURE — C1713 ANCHOR/SCREW BN/BN,TIS/BN: HCPCS | Performed by: ORTHOPAEDIC SURGERY

## 2024-04-04 PROCEDURE — 6370000000 HC RX 637 (ALT 250 FOR IP): Performed by: ANESTHESIOLOGY

## 2024-04-04 PROCEDURE — 2580000003 HC RX 258: Performed by: ANESTHESIOLOGY

## 2024-04-04 PROCEDURE — 84132 ASSAY OF SERUM POTASSIUM: CPT

## 2024-04-04 PROCEDURE — 26746 TREAT FINGER FRACTURE EACH: CPT | Performed by: ORTHOPAEDIC SURGERY

## 2024-04-04 PROCEDURE — 3700000000 HC ANESTHESIA ATTENDED CARE: Performed by: ORTHOPAEDIC SURGERY

## 2024-04-04 PROCEDURE — 64415 NJX AA&/STRD BRCH PLXS IMG: CPT | Performed by: ANESTHESIOLOGY

## 2024-04-04 PROCEDURE — 6360000002 HC RX W HCPCS

## 2024-04-04 PROCEDURE — 3600000014 HC SURGERY LEVEL 4 ADDTL 15MIN: Performed by: ORTHOPAEDIC SURGERY

## 2024-04-04 PROCEDURE — 7100000000 HC PACU RECOVERY - FIRST 15 MIN: Performed by: ORTHOPAEDIC SURGERY

## 2024-04-04 PROCEDURE — 2500000003 HC RX 250 WO HCPCS: Performed by: NURSE ANESTHETIST, CERTIFIED REGISTERED

## 2024-04-04 PROCEDURE — 3700000001 HC ADD 15 MINUTES (ANESTHESIA): Performed by: ORTHOPAEDIC SURGERY

## 2024-04-04 PROCEDURE — 2580000003 HC RX 258: Performed by: NURSE ANESTHETIST, CERTIFIED REGISTERED

## 2024-04-04 PROCEDURE — 7100000010 HC PHASE II RECOVERY - FIRST 15 MIN: Performed by: ORTHOPAEDIC SURGERY

## 2024-04-04 DEVICE — SCREW BNE L8MM DIA2MM CORT S STL ST LOK FULL THRD T6: Type: IMPLANTABLE DEVICE | Site: HAND | Status: FUNCTIONAL

## 2024-04-04 DEVICE — IMPLANTABLE DEVICE: Type: IMPLANTABLE DEVICE | Site: HAND | Status: FUNCTIONAL

## 2024-04-04 DEVICE — SCREW BNE L10MM DIA2MM CORT S STL ST LOK FULL THRD T6: Type: IMPLANTABLE DEVICE | Site: HAND | Status: FUNCTIONAL

## 2024-04-04 DEVICE — SCREW BNE L13MM DIA2MM HND S STL ST LOK VAR ANG T6 STARDRV: Type: IMPLANTABLE DEVICE | Site: HAND | Status: FUNCTIONAL

## 2024-04-04 RX ORDER — IPRATROPIUM BROMIDE AND ALBUTEROL SULFATE 2.5; .5 MG/3ML; MG/3ML
1 SOLUTION RESPIRATORY (INHALATION)
Status: DISCONTINUED | OUTPATIENT
Start: 2024-04-04 | End: 2024-04-04 | Stop reason: HOSPADM

## 2024-04-04 RX ORDER — SODIUM CHLORIDE 0.9 % (FLUSH) 0.9 %
5-40 SYRINGE (ML) INJECTION EVERY 12 HOURS SCHEDULED
Status: DISCONTINUED | OUTPATIENT
Start: 2024-04-04 | End: 2024-04-04 | Stop reason: HOSPADM

## 2024-04-04 RX ORDER — MIDAZOLAM HYDROCHLORIDE 2 MG/2ML
2 INJECTION, SOLUTION INTRAMUSCULAR; INTRAVENOUS
Status: COMPLETED | OUTPATIENT
Start: 2024-04-04 | End: 2024-04-04

## 2024-04-04 RX ORDER — LIDOCAINE HYDROCHLORIDE 20 MG/ML
INJECTION, SOLUTION EPIDURAL; INFILTRATION; INTRACAUDAL; PERINEURAL PRN
Status: DISCONTINUED | OUTPATIENT
Start: 2024-04-04 | End: 2024-04-04 | Stop reason: SDUPTHER

## 2024-04-04 RX ORDER — ONDANSETRON 2 MG/ML
4 INJECTION INTRAMUSCULAR; INTRAVENOUS
Status: DISCONTINUED | OUTPATIENT
Start: 2024-04-04 | End: 2024-04-04 | Stop reason: HOSPADM

## 2024-04-04 RX ORDER — SODIUM CHLORIDE 0.9 % (FLUSH) 0.9 %
5-40 SYRINGE (ML) INJECTION PRN
Status: DISCONTINUED | OUTPATIENT
Start: 2024-04-04 | End: 2024-04-04 | Stop reason: HOSPADM

## 2024-04-04 RX ORDER — SODIUM CHLORIDE 9 MG/ML
INJECTION, SOLUTION INTRAVENOUS PRN
Status: DISCONTINUED | OUTPATIENT
Start: 2024-04-04 | End: 2024-04-04 | Stop reason: HOSPADM

## 2024-04-04 RX ORDER — HALOPERIDOL 5 MG/ML
1 INJECTION INTRAMUSCULAR
Status: DISCONTINUED | OUTPATIENT
Start: 2024-04-04 | End: 2024-04-04 | Stop reason: HOSPADM

## 2024-04-04 RX ORDER — HYDROMORPHONE HYDROCHLORIDE 2 MG/ML
0.5 INJECTION, SOLUTION INTRAMUSCULAR; INTRAVENOUS; SUBCUTANEOUS EVERY 10 MIN PRN
Status: DISCONTINUED | OUTPATIENT
Start: 2024-04-04 | End: 2024-04-04 | Stop reason: HOSPADM

## 2024-04-04 RX ORDER — ROPIVACAINE HYDROCHLORIDE 5 MG/ML
INJECTION, SOLUTION EPIDURAL; INFILTRATION; PERINEURAL
Status: DISCONTINUED | OUTPATIENT
Start: 2024-04-04 | End: 2024-04-04 | Stop reason: SDUPTHER

## 2024-04-04 RX ORDER — LIDOCAINE HYDROCHLORIDE 10 MG/ML
1 INJECTION, SOLUTION INFILTRATION; PERINEURAL
Status: DISCONTINUED | OUTPATIENT
Start: 2024-04-04 | End: 2024-04-04 | Stop reason: HOSPADM

## 2024-04-04 RX ORDER — NALOXONE HYDROCHLORIDE 0.4 MG/ML
INJECTION, SOLUTION INTRAMUSCULAR; INTRAVENOUS; SUBCUTANEOUS PRN
Status: DISCONTINUED | OUTPATIENT
Start: 2024-04-04 | End: 2024-04-04 | Stop reason: HOSPADM

## 2024-04-04 RX ORDER — PROPOFOL 10 MG/ML
INJECTION, EMULSION INTRAVENOUS PRN
Status: DISCONTINUED | OUTPATIENT
Start: 2024-04-04 | End: 2024-04-04 | Stop reason: SDUPTHER

## 2024-04-04 RX ORDER — ONDANSETRON 4 MG/1
4 TABLET, FILM COATED ORAL EVERY 8 HOURS PRN
Qty: 20 TABLET | Refills: 0 | Status: SHIPPED | OUTPATIENT
Start: 2024-04-04

## 2024-04-04 RX ORDER — ACETAMINOPHEN 500 MG
1000 TABLET ORAL ONCE
Status: COMPLETED | OUTPATIENT
Start: 2024-04-04 | End: 2024-04-04

## 2024-04-04 RX ORDER — SODIUM CHLORIDE, SODIUM LACTATE, POTASSIUM CHLORIDE, CALCIUM CHLORIDE 600; 310; 30; 20 MG/100ML; MG/100ML; MG/100ML; MG/100ML
INJECTION, SOLUTION INTRAVENOUS CONTINUOUS
Status: DISCONTINUED | OUTPATIENT
Start: 2024-04-04 | End: 2024-04-04 | Stop reason: HOSPADM

## 2024-04-04 RX ORDER — OXYCODONE HYDROCHLORIDE 5 MG/1
5 TABLET ORAL EVERY 6 HOURS PRN
Qty: 20 TABLET | Refills: 0 | Status: SHIPPED | OUTPATIENT
Start: 2024-04-04 | End: 2024-04-09

## 2024-04-04 RX ORDER — FENTANYL CITRATE 50 UG/ML
50 INJECTION, SOLUTION INTRAMUSCULAR; INTRAVENOUS EVERY 5 MIN PRN
Status: DISCONTINUED | OUTPATIENT
Start: 2024-04-04 | End: 2024-04-04 | Stop reason: HOSPADM

## 2024-04-04 RX ORDER — OXYCODONE HYDROCHLORIDE 5 MG/1
5 TABLET ORAL
Status: DISCONTINUED | OUTPATIENT
Start: 2024-04-04 | End: 2024-04-04 | Stop reason: HOSPADM

## 2024-04-04 RX ORDER — SODIUM CHLORIDE, SODIUM LACTATE, POTASSIUM CHLORIDE, CALCIUM CHLORIDE 600; 310; 30; 20 MG/100ML; MG/100ML; MG/100ML; MG/100ML
INJECTION, SOLUTION INTRAVENOUS CONTINUOUS PRN
Status: DISCONTINUED | OUTPATIENT
Start: 2024-04-04 | End: 2024-04-04 | Stop reason: SDUPTHER

## 2024-04-04 RX ORDER — FENTANYL CITRATE 50 UG/ML
INJECTION, SOLUTION INTRAMUSCULAR; INTRAVENOUS
Status: COMPLETED
Start: 2024-04-04 | End: 2024-04-04

## 2024-04-04 RX ORDER — FENTANYL CITRATE 50 UG/ML
100 INJECTION, SOLUTION INTRAMUSCULAR; INTRAVENOUS ONCE
Status: DISCONTINUED | OUTPATIENT
Start: 2024-04-04 | End: 2024-04-04 | Stop reason: HOSPADM

## 2024-04-04 RX ADMIN — ROPIVACAINE HYDROCHLORIDE 30 ML: 5 INJECTION, SOLUTION EPIDURAL; INFILTRATION; PERINEURAL at 11:26

## 2024-04-04 RX ADMIN — SODIUM CHLORIDE, POTASSIUM CHLORIDE, SODIUM LACTATE AND CALCIUM CHLORIDE: 600; 310; 30; 20 INJECTION, SOLUTION INTRAVENOUS at 11:00

## 2024-04-04 RX ADMIN — Medication 2000 MG: at 12:07

## 2024-04-04 RX ADMIN — PROPOFOL 150 MCG/KG/MIN: 10 INJECTION, EMULSION INTRAVENOUS at 12:03

## 2024-04-04 RX ADMIN — PHENYLEPHRINE HYDROCHLORIDE 150 MCG: 0.1 INJECTION, SOLUTION INTRAVENOUS at 12:27

## 2024-04-04 RX ADMIN — FENTANYL CITRATE 100 MCG: 50 INJECTION INTRAMUSCULAR; INTRAVENOUS at 11:26

## 2024-04-04 RX ADMIN — SODIUM CHLORIDE, SODIUM LACTATE, POTASSIUM CHLORIDE, AND CALCIUM CHLORIDE: 600; 310; 30; 20 INJECTION, SOLUTION INTRAVENOUS at 11:50

## 2024-04-04 RX ADMIN — PROPOFOL 100 MG: 10 INJECTION, EMULSION INTRAVENOUS at 12:01

## 2024-04-04 RX ADMIN — LIDOCAINE HYDROCHLORIDE 100 MG: 20 INJECTION, SOLUTION EPIDURAL; INFILTRATION; INTRACAUDAL; PERINEURAL at 11:59

## 2024-04-04 RX ADMIN — SODIUM CHLORIDE, SODIUM LACTATE, POTASSIUM CHLORIDE, AND CALCIUM CHLORIDE: 600; 310; 30; 20 INJECTION, SOLUTION INTRAVENOUS at 11:15

## 2024-04-04 RX ADMIN — MIDAZOLAM 2 MG: 1 INJECTION INTRAMUSCULAR; INTRAVENOUS at 11:26

## 2024-04-04 RX ADMIN — ACETAMINOPHEN 1000 MG: 500 TABLET, FILM COATED ORAL at 11:30

## 2024-04-04 ASSESSMENT — PAIN SCALES - GENERAL
PAINLEVEL_OUTOF10: 0

## 2024-04-04 ASSESSMENT — PAIN - FUNCTIONAL ASSESSMENT: PAIN_FUNCTIONAL_ASSESSMENT: 0-10

## 2024-04-04 ASSESSMENT — PAIN DESCRIPTION - DESCRIPTORS: DESCRIPTORS: ACHING

## 2024-04-04 NOTE — ANESTHESIA PRE PROCEDURE
Department of Anesthesiology  Preprocedure Note       Name:  Messi Dumont   Age:  53 y.o.  :  1970                                          MRN:  [id number]         Date:  2024      Surgeon: Surgeon(s):  Bryson Temple MD    Procedure: Procedure(s):  FINGER CLOSED REDUCTION PINNING of the right small proximal phalanx  ORIF right 5th metacarpal fracture    Medications prior to admission:   Prior to Admission medications    Medication Sig Start Date End Date Taking? Authorizing Provider   losartan-hydroCHLOROthiazide (HYZAAR) 100-25 MG per tablet daily 24  Yes Provider, MD Shy   amLODIPine (NORVASC) 10 MG tablet 1 tablet daily 24  Yes Provider, MD Shy   vitamin D (ERGOCALCIFEROL) 1.25 MG (08950 UT) CAPS capsule once a week Takes on 24  Yes Provider, MD Shy   tadalafil (CIALIS) 5 MG tablet as needed 3/29/24  Yes Provider, MD Shy   MOUNJARO 2.5 MG/0.5ML SOPN SC injection once a week Takes on   last dose 3/24  Takes for weight loss 24  Yes Provider, MD Shy   Multiple Vitamin (MULTIVITAMIN ADULT PO) Take by mouth daily   Yes Provider, MD Shy   oxyCODONE (ROXICODONE) 5 MG immediate release tablet Take 1 tablet by mouth every 6 hours as needed for Pain for up to 5 days. Max Daily Amount: 20 mg 24  Chloe Madera APRN - CNP   ondansetron (ZOFRAN) 4 MG tablet Take 1 tablet by mouth every 8 hours as needed for Nausea or Vomiting 24   Chloe Madera APRN - VIET   oxyCODONE-acetaminophen (PERCOCET) 5-325 MG per tablet Take 1 tablet by mouth every 6 hours as needed for Pain for up to 3 days. Intended supply: 3 days. Take lowest dose possible to manage pain Max Daily Amount: 4 tablets 24  Nakul Lassiter MD       Current medications:    Current Facility-Administered Medications   Medication Dose Route Frequency Provider Last Rate Last Admin   • lidocaine 1 % injection 1 mL  1 mL IntraDERmal Once PRN

## 2024-04-04 NOTE — PROGRESS NOTES
visited patient in pre op, as requested.  Patient was calm and expressed a positive outlook.  Wife was at bedside and supportive.   provided pastoral presence, prayer, and empathetic listening.  Peace be with you,  Signed by  NEVIN HuDiv.   [phone]

## 2024-04-04 NOTE — DISCHARGE INSTRUCTIONS
Postoperative  Instructions:      Weightbearing or Lifting:  You  are  not  allowed  to  lift  any  weight  or  bear  any  weight  on  the  surgical  extremity  until  cleared  by  your  surgeon.      Dressing  instructions:    Keep  your  dressing  and/or  splint  clean  and  dry  at  all  times.    It  will  be  removed  at  your  first  post-operative  appointment or therapy apppointment.    Your  stitches  will  be  removed  at  this  visit.      Showering  Instructions:  May  shower  But keep surgical dressing clean and dry until removed as explained above.      Pain  Control:  - You  have  been  given  a  prescription  to  be  taken  as  directed  for  post-operative  pain  control.    In  addition,  elevate  the  operative  extremity  above  the  heart  at  all  times  to  prevent  swelling  and  throbbing  pain.   - If you develop constipation while taking narcotic pain medications (Norco, Hydrocodone, Percocet, Oxycodone, Dilaudid, Hydromorphone) take  over-the-counter  Colace,  100mg  by  mouth  twice  a  Day.     - Nausea  is  a  common  side  effect  of  many  pain  medications.  You  will  want  to  eat something  before  taking  your  pain  medicine  to  help  prevent  Nausea.  - If  you  are  taking  a  prescription  pain  medication  that  contains  acetaminophen,  we  recommend  that  you  do  not  take  additional  over  the  counter  acetaminophen  (Tylenol®).      Other  pain  relieving  options:   - Using  a  cold  pack  to  ice  the  affected  area  a  few  times  a  day  (15  to  20  minutes  at  a  time)  can  help  to  relieve  pain,  reduce  swelling  and  bruising.      - Elevation  of  the  affected  area  can  also  help  to  reduce  pain  and  swelling.      Did  you  receive  a  nerve  Block?  A  nerve  block  can  provide  pain  relief  for  one  hour  to  two  days  after  your  surgery.  As  long  as  the  nerve  block  is  working,  you  will  experience  little  or  no  sensation   in  the  area  the  surgeon  operated  on.        As  the  nerve  block  wears  off,  you  will  begin  to  experience  pain  or  discomfort.  It  is  very  important  that  you  begin  taking  your  prescribed  pain  medication  before  the  nerve  block  fully  wears  off. The first sign that the nerve block is wearing off is tingling in your fingers. Treating  your  pain  at  the  first  sign  of  the  block  wearing  off  will  ensure  your  pain  is  better  controlled  and  more  tolerable  when  full-sensation  returns.  Do  not  wait  until  the  pain  is  intolerable,  as  the  medicine  will  be  less  effective.  It  is  better  to  treat  pain  in  advance  than  to  try  and  catch  up.        General  Anesthesia or Sedation:      If  you  did  not  receive  a  nerve  block  during  your  surgery,  you  will  need  to  start  taking  your  pain  medication  shortly  after  your  surgery  and  should  continue  to  do  so  as  prescribed  by  your  surgeon.       Please contact us through my chart or call  [phone]  with any concern and ask to speak with Dr Temple team.    Concerning problems include:      -  Excessive  redness  of  the  incisions      -  Drainage  for  more  than  2  Days after surgery or any foul smelling drainage  -  Fever  of  more  than  101.5  F      Please  call  [phone]  if  you  do  not  receive  or  are  unsure  of  your  first  follow-up  appointment.    You  should  see  the  doctor  10-14  days  after  your  Surgery.    Thank you for choosing me and [facility] for your care. I will go above and beyond to ensure you receive the best care possible.    Bryson Rader Jr, MD, PhD

## 2024-04-04 NOTE — ANESTHESIA POSTPROCEDURE EVALUATION
Department of Anesthesiology  Postprocedure Note    Patient: Messi Dumont  MRN: [id number]  YOB: 1970  Date of evaluation: 4/4/2024    Procedure Summary       Date: 04/04/24 Room / Location: [facility] OP OR 07 / [facility]    Anesthesia Start: 1150 Anesthesia Stop: 1313    Procedures:       FINGER CLOSED REDUCTION PINNING of the right small proximal phalanx (Right: Hand)      ORIF right 5th metacarpal fracture (Right: Hand) Diagnosis:       Closed displaced fracture of proximal phalanx of right little finger, initial encounter      Closed disp fracture of base of fifth metacarpal bone of right hand      (Closed displaced fracture of proximal phalanx of right little finger, initial encounter [S62.670U])      (Closed disp fracture of base of fifth metacarpal bone of right hand [S62.316A])    Surgeons: Bryson Temple MD Responsible Provider: Atul Trujillo MD    Anesthesia Type: Not recorded ASA Status: 2            Anesthesia Type: No value filed.    Conrad Phase I: Conrad Score: 8    Conrad Phase II: Conrad Score: 9    Anesthesia Post Evaluation    Patient location during evaluation: PACU  Patient participation: complete - patient participated  Level of consciousness: awake and alert  Airway patency: patent  Nausea & Vomiting: no nausea and no vomiting  Cardiovascular status: hemodynamically stable  Respiratory status: acceptable, nonlabored ventilation and spontaneous ventilation  Hydration status: euvolemic  Comments: BP (!) 149/87   Pulse 76   Temp 97.6 °F (36.4 °C) (Temporal)   Resp 21   Ht 1.829 m (6')   Wt 108.9 kg (240 lb)   SpO2 97%   BMI 32.55 kg/m²     Multimodal analgesia pain management approach  Pain management: adequate and satisfactory to patient    No notable events documented.

## 2024-04-04 NOTE — ANESTHESIA PROCEDURE NOTES
Peripheral Block    Patient location during procedure: pre-op  Reason for block: post-op pain management and at surgeon's request  Start time: 4/4/2024 11:26 AM  End time: 4/4/2024 11:35 AM  Staffing  Performed: anesthesiologist   Anesthesiologist: Atul Trujillo MD  Performed by: Atul Trujillo MD  Authorized by: Atul Trujillo MD    Preanesthetic Checklist  Completed: patient identified, IV checked, site marked, risks and benefits discussed, surgical/procedural consents, equipment checked, pre-op evaluation, timeout performed, anesthesia consent given, oxygen available, monitors applied/VS acknowledged and blood product R/B/A discussed and consented  Peripheral Block   Patient position: sitting  Prep: ChloraPrep  Provider prep: mask and sterile gloves  Block type: Brachial plexus  Supraclavicular  Laterality: right  Injection technique: single-shot  Guidance: ultrasound guided  Local infiltration: lidocaine  Infiltration strength: 1 %  Local infiltration: lidocaine  Dose: 3 mL    Needle   Needle type: insulated echogenic nerve stimulator needle   Needle gauge: 18 G  Needle localization: ultrasound guidance  Needle length: 10 cm  Assessment   Injection assessment: negative aspiration for heme, no paresthesia on injection, local visualized surrounding nerve on ultrasound and no intravascular symptoms  Paresthesia pain: none  Slow fractionated injection: yes  Hemodynamics: stable  Outcomes: uncomplicated    Medications Administered  ropivacaine (NAROPIN) injection 0.5% - Perineural   30 mL - 4/4/2024 11:26:00 AM

## 2024-04-04 NOTE — INTERVAL H&P NOTE
H&P Update:  Messi Dumont was seen and examined.  History and physical has been reviewed. The patient has been examined. There have been significant clinical changes since the completion of the originally dated History and Physical. Ganglion cyst on the right wrist for a long time, he would like to have it drained today.    Bryson Temple MD  Orthopaedic Surgery  04/04/24  10:43 AM

## 2024-04-05 NOTE — OP NOTE
ORTHOPAEDIC SURGICAL NOTE        Messi Dumont male 53 y.o.  [id number]   4/4/2024     PRE-OP DIAGNOSIS: Pre-Op Diagnosis Codes:     * Closed displaced fracture of proximal phalanx of right little finger, initial encounter [S62.616A]     * Closed disp fracture of base of fifth metacarpal bone of right hand [S62.316A]  POST-OP DIAGNOSIS: Same  LATERALITY: Right  Right     PROCEDURES PERFORMED:   Open reduction internal fixation of right fifth metacarpal neck articular fracture at the MCP joint, open reduction to opposition of right proximal phalanx shaft fracture       SURGEON:   Bryson Temple Jr, MD, PhD     IMPLANTS:   Implant Name Type Inv. Item Serial No.  Lot No. LRB No. Used Action   PLATE BNE L41MM [id number] 2X6 H HND 316L S STL GUZMAN ANG ZUHAIR ROT - [id number]  PLATE BNE L41MM [id number] 2X6 H HND 316L S STL GUZMAN ANG ZUHAIR ROT  [employer]- [id number] Right 1 Implanted   SCREW BNE L13MM DIA2MM HND S STL ST ZUHAIR GUZMAN ANG T6 [employer]AR[employer] - [id number]  SCREW BNE L13MM DIA2MM HND S STL ST ZUHAIR GUZMAN ANG T6 STARDRV  DEPUY SYNTHES [facility]- [id number] Right 3 Implanted   SCREW BNE L10MM DIA2MM DADA S STL ST ZUHAIR FULL THRD T6 - [id number]  SCREW BNE L10MM DIA2MM DADA S STL ST ZUHAIR FULL THRD T6  [employer]- [id number] Right 2 Implanted   SCREW BNE L8MM DIA2MM DADA S STL ST ZUHAIR FULL THRD T6 - [id number]  SCREW BNE L8MM DIA2MM DADA S STL ST ZUHAIR FULL THRD T6  [employer]PU[employer]- [id number] Right 1 Implanted   SCREW BNE SHT THRD 2.5X32 MM 8 MM EDGAR HDLSS TI - [id number]  SCREW BNE SHT THRD 2.5X32 MM 8 MM EDGAR HDLSS TI  DEPUY [employer]-WD [id number] Right 1 Implanted      Procedure(s):  FINGER CLOSED REDUCTION PINNING of the right small proximal phalanx  ORIF right 5th metacarpal fracture   Surgeon(s):  Bryson Temple MD   Procedure(s):  FINGER CLOSED REDUCTION PINNING of the right small proximal phalanx  ORIF right 5th metacarpal fracture     ANESTHESIA: Regional     STAFF:    Circulator: Krishna Canales,

## 2024-04-09 ENCOUNTER — EVALUATION (OUTPATIENT)
Age: 54
End: 2024-04-09

## 2024-04-09 ENCOUNTER — TELEPHONE (OUTPATIENT)
Dept: ORTHOPEDIC SURGERY | Age: 54
End: 2024-04-09

## 2024-04-09 DIAGNOSIS — M25.60 DECREASED RANGE OF MOTION: ICD-10-CM

## 2024-04-09 DIAGNOSIS — S62.316A DISPLACED FRACTURE OF BASE OF FIFTH METACARPAL BONE, RIGHT HAND, INITIAL ENCOUNTER FOR CLOSED FRACTURE: ICD-10-CM

## 2024-04-09 DIAGNOSIS — S62.617A DISPLACED FRACTURE OF PROXIMAL PHALANX OF LEFT LITTLE FINGER, INITIAL ENCOUNTER FOR CLOSED FRACTURE: ICD-10-CM

## 2024-04-09 DIAGNOSIS — Z78.9 DECREASED ACTIVITIES OF DAILY LIVING (ADL): ICD-10-CM

## 2024-04-09 DIAGNOSIS — S62.616A CLOSED DISPLACED FRACTURE OF PROXIMAL PHALANX OF RIGHT LITTLE FINGER, INITIAL ENCOUNTER: ICD-10-CM

## 2024-04-09 DIAGNOSIS — S62.316A CLOSED DISPLACED FRACTURE OF BASE OF FIFTH METACARPAL BONE OF RIGHT HAND, INITIAL ENCOUNTER: ICD-10-CM

## 2024-04-09 DIAGNOSIS — S62.316A DISPLACED FRACTURE OF BASE OF FIFTH METACARPAL BONE, RIGHT HAND, INITIAL ENCOUNTER FOR CLOSED FRACTURE: Primary | ICD-10-CM

## 2024-04-09 DIAGNOSIS — M25.641 STIFFNESS OF FINGER JOINT OF RIGHT HAND: Primary | ICD-10-CM

## 2024-04-09 RX ORDER — OXYCODONE HYDROCHLORIDE 5 MG/1
5 TABLET ORAL EVERY 6 HOURS PRN
Qty: 20 TABLET | Refills: 0 | Status: SHIPPED | OUTPATIENT
Start: 2024-04-09 | End: 2024-04-14

## 2024-04-09 NOTE — PROGRESS NOTES
[facility] ORTHOPAED[facility]  [address] [place] [id number]  Dept: [phone]      Occupational Therapy Initial Assessment     Referring MD: Chloe Madera APRN - C*    Diagnosis:     ICD-10-CM    1. Stiffness of finger joint of right hand  M25.641       2. Decreased range of motion  M25.60       3. Decreased activities of daily living (ADL)  Z78.9       4. Closed displaced fracture of base of fifth metacarpal bone of right hand, initial encounter  S62.316A       5. Displaced fracture of proximal phalanx of left little finger, initial encounter for closed fracture  S62.617A            Surgery: Date 4/4/24     Therapy precautions: Fracture precautions    History of injury/onset : The patient is a 53 year old male s/p Open reduction internal fixation of right fifth metacarpal neck articular fracture at the MCP joint and open reduction of right proximal phalanx shaft fracture with screw       Payor: worker's comp  Billing pattern: Workers comp- substantial/midpoint time each CPT  Total Direct Treatment Time: 15 min                       Total In Office Time: 75 min  Modifier needed: No  Episode visit count:  1     Preferred Name:  Messi    PERTINENT MEDICAL HISTORY     PMHX & Meds:   Past Medical History:   Diagnosis Date    ED (erectile dysfunction)     Hypertension     medication    Obesity     patient stated taking mounjaro for wt loss   ,   Past Surgical History:   Procedure Laterality Date    FINGER CLOSED REDUCTION Right 4/4/2024    FINGER CLOSED REDUCTION PINNING of the right small proximal phalanx performed by Bryson Temple MD at [facility]    HAND SURGERY Right 4/4/2024    ORIF right 5th metacarpal fracture performed by Bryson Temple MD at [facility]      Medications. : Reviewed in chart  Allergies: No Known Allergies     SUBJECTIVE     Current Symptoms/Chief complaints:   Chief Complaint   Patient presents with    Hand Injury       Chief complaint/history of injury:     Number of

## 2024-04-09 NOTE — TELEPHONE ENCOUNTER
He is requesting a refill on hydrocodone. He says he needs a stronger dose, this is not controlling his pain. He does use [facility] @ [employer].

## 2024-04-11 ENCOUNTER — TELEPHONE (OUTPATIENT)
Dept: ORTHOPEDIC SURGERY | Age: 54
End: 2024-04-11

## 2024-04-11 DIAGNOSIS — S62.616A CLOSED DISPLACED FRACTURE OF PROXIMAL PHALANX OF RIGHT LITTLE FINGER, INITIAL ENCOUNTER: ICD-10-CM

## 2024-04-11 DIAGNOSIS — S62.316A DISPLACED FRACTURE OF BASE OF FIFTH METACARPAL BONE, RIGHT HAND, INITIAL ENCOUNTER FOR CLOSED FRACTURE: Primary | ICD-10-CM

## 2024-04-11 NOTE — TELEPHONE ENCOUNTER
Marley with [facility] says he will be seeing a certified hand therapist but because it is WC it will need to say PT on the order.

## 2024-04-12 ENCOUNTER — TREATMENT (OUTPATIENT)
Age: 54
End: 2024-04-12
Payer: COMMERCIAL

## 2024-04-12 DIAGNOSIS — M25.60 DECREASED RANGE OF MOTION: ICD-10-CM

## 2024-04-12 DIAGNOSIS — Z78.9 DECREASED ACTIVITIES OF DAILY LIVING (ADL): ICD-10-CM

## 2024-04-12 DIAGNOSIS — M25.641 STIFFNESS OF FINGER JOINT OF RIGHT HAND: Primary | ICD-10-CM

## 2024-04-12 PROCEDURE — 97010 HOT OR COLD PACKS THERAPY: CPT

## 2024-04-12 PROCEDURE — 97110 THERAPEUTIC EXERCISES: CPT

## 2024-04-12 PROCEDURE — 97763 ORTHC/PROSTC MGMT SBSQ ENC: CPT

## 2024-04-12 NOTE — PROGRESS NOTES
[facility] ORTHOPAED[facility]  [address] [place] [id number]  Dept: [phone]      Occupational Therapy Discharge     Referring MD: Chloe Madera APRN - C*    Diagnosis:     ICD-10-CM    1. Stiffness of finger joint of right hand  M25.641       2. Decreased range of motion  M25.60       3. Decreased activities of daily living (ADL)  Z78.9            Surgery: Date 4/4/24     Therapy precautions: Fracture precautions    History of injury/onset : The patient is a 53 year old male s/p Open reduction internal fixation of right fifth metacarpal neck articular fracture at the MCP joint and open reduction of right proximal phalanx shaft fracture with screw       Payor: worker's comp  Billing pattern: Workers comp- substantial/midpoint time each CPT  Total Direct Treatment Time: 45 min                       Total In Office Time: 55 min  Modifier needed: No  Episode visit count:  2     Preferred Name:  Messi    PERTINENT MEDICAL HISTORY     PMHX & Meds:   Past Medical History:   Diagnosis Date    ED (erectile dysfunction)     Hypertension     medication    Obesity     patient stated taking mounjaro for wt loss   ,   Past Surgical History:   Procedure Laterality Date    FINGER CLOSED REDUCTION Right 4/4/2024    FINGER CLOSED REDUCTION PINNING of the right small proximal phalanx performed by Bryson Temple MD at [facility]    HAND SURGERY Right 4/4/2024    ORIF right 5th metacarpal fracture performed by Bryson Temple MD at [facility]      Medications. : Reviewed in chart  Allergies: No Known Allergies     SUBJECTIVE     Current Symptoms/Chief complaints:   Chief Complaint   Patient presents with    Finger Injury       OBJECTIVE       Digital AROM:  IE IF LF RF SF   AROM    MCP    5°    AROM      PIP    75/85°    AROM      DIP    0/5°    Active flexion to DPC                    Hot Pack (59685) x 10 minutes to right hand prior to treatment for moist heat/tissue extensibility in preparation for treatment.

## 2024-04-17 ENCOUNTER — CLINICAL DOCUMENTATION (OUTPATIENT)
Dept: ORTHOPEDIC SURGERY | Age: 54
End: 2024-04-17

## 2024-04-17 ENCOUNTER — OFFICE VISIT (OUTPATIENT)
Dept: ORTHOPEDIC SURGERY | Age: 54
End: 2024-04-17
Payer: COMMERCIAL

## 2024-04-17 DIAGNOSIS — S62.316A DISPLACED FRACTURE OF BASE OF FIFTH METACARPAL BONE, RIGHT HAND, INITIAL ENCOUNTER FOR CLOSED FRACTURE: Primary | ICD-10-CM

## 2024-04-17 PROCEDURE — 99024 POSTOP FOLLOW-UP VISIT: CPT | Performed by: ORTHOPAEDIC SURGERY

## 2024-04-17 RX ORDER — TRAMADOL HYDROCHLORIDE 50 MG/1
50 TABLET ORAL EVERY 6 HOURS PRN
Qty: 20 TABLET | Refills: 0 | Status: SHIPPED | OUTPATIENT
Start: 2024-04-17 | End: 2024-04-22

## 2024-04-17 RX ORDER — AMITRIPTYLINE HYDROCHLORIDE 25 MG/1
25 TABLET, FILM COATED ORAL NIGHTLY
Qty: 30 TABLET | Refills: 0 | Status: SHIPPED | OUTPATIENT
Start: 2024-04-17 | End: 2024-05-17

## 2024-04-17 RX ORDER — METHYLPREDNISOLONE 4 MG/1
TABLET ORAL
Qty: 1 KIT | Refills: 0 | Status: SHIPPED | OUTPATIENT
Start: 2024-04-17

## 2024-04-17 NOTE — PROGRESS NOTES
Orthopaedic Hand Surgery Note    Name: Messi Dumont  Age: 53 y.o.  YOB: 1970  Gender: male  MRN: [id number]    Post Operative Visit: FINGER CLOSED REDUCTION PINNING of the right small proximal phalanx - Right and ORIF right 5th metacarpal fracture - Right    HPI: Patient is status post FINGER CLOSED REDUCTION PINNING of the right small proximal phalanx - Right and ORIF right 5th metacarpal fracture - Right on 4/4/2024. Patient reports moderate pain mostly at night that wakes him up, he is also reporting intermittent numbness and paresthesias in median distribution, this happens both at daytime and nighttime.    Physical Examination:  Well-healed surgical wounds. Sensation is intact in all fingers. Motor exam reveals no deficits.  Very limited small finger motion, patient has a positive carpal tunnel compression test.    Imaging:     right Hand XR: AP, Lateral, Oblique and Thumb CMC joint     Clinical Indication:  1. Displaced fracture of base of fifth metacarpal bone, right hand, initial encounter for closed fracture           Report: AP, lateral, oblique and thumb CMC joint hand XRs demonstrates fifth metacarpal and small finger proximal phalanx fracture status post plate and screw fixation as well as intramedullary screw fixation of the proximal phalanx and unchanged alignment from intraoperative x-rays    Impression: as above     Bryson Temple MD         Assessment:   1. Displaced fracture of base of fifth metacarpal bone, right hand, initial encounter for closed fracture         Status post FINGER CLOSED REDUCTION PINNING of the right small proximal phalanx - Right and ORIF right 5th metacarpal fracture - Right on 4/4/2024    Plan:  We discussed the post operative course and progression.  Continue therapy to work on active motion, passive motion and 4 weeks, I will prescribe a Medrol Dosepak to help with the carpal tunnel symptoms, it appears that swelling from the surgery has caused

## 2024-04-18 ENCOUNTER — CLINICAL DOCUMENTATION (OUTPATIENT)
Dept: ORTHOPEDIC SURGERY | Age: 54
End: 2024-04-18

## 2024-05-01 ENCOUNTER — TELEPHONE (OUTPATIENT)
Dept: ORTHOPEDIC SURGERY | Age: 54
End: 2024-05-01

## 2024-05-01 NOTE — TELEPHONE ENCOUNTER
She left a voicemail wanting to know how long he has to wear the splint. He is going on a trip and wants to know if he can take it off any. Please let her know.

## 2024-05-02 NOTE — TELEPHONE ENCOUNTER
Spoke to the therapist and she will relay the message to the patient regarding the instruction for the splint

## 2024-05-15 ENCOUNTER — OFFICE VISIT (OUTPATIENT)
Age: 54
End: 2024-05-15

## 2024-05-15 DIAGNOSIS — S62.316A DISPLACED FRACTURE OF BASE OF FIFTH METACARPAL BONE, RIGHT HAND, INITIAL ENCOUNTER FOR CLOSED FRACTURE: Primary | ICD-10-CM

## 2024-05-15 DIAGNOSIS — S62.616A CLOSED DISPLACED FRACTURE OF PROXIMAL PHALANX OF RIGHT LITTLE FINGER, INITIAL ENCOUNTER: ICD-10-CM

## 2024-05-15 PROCEDURE — 99024 POSTOP FOLLOW-UP VISIT: CPT | Performed by: ORTHOPAEDIC SURGERY

## 2024-05-15 NOTE — PROGRESS NOTES
Orthopaedic Hand Surgery Note    Name: Messi Dumont  Age: 53 y.o.  YOB: 1970  Gender: male  MRN: [id number]    Post Operative Visit: FINGER CLOSED REDUCTION PINNING of the right small proximal phalanx - Right and ORIF right 5th metacarpal fracture - Right    HPI: Patient is status post FINGER CLOSED REDUCTION PINNING of the right small proximal phalanx - Right and ORIF right 5th metacarpal fracture - Right on 4/4/2024. Patient reports minimal pain but significant stiffness.    Physical Examination:  Well-healed surgical wounds. Sensation is intact in all fingers. Motor exam reveals no deficits.  Significant stiffness of the MCP and PIP joints without tenderness to palpation.    Imaging:     right Hand XR: AP, Lateral, Oblique and Thumb CMC joint     Clinical Indication:  1. Displaced fracture of base of fifth metacarpal bone, right hand, initial encounter for closed fracture    2. Closed displaced fracture of proximal phalanx of right little finger, initial encounter           Report: AP, lateral, oblique and thumb CMC joint hand XRs demonstrates healing right fifth metacarpal neck and proximal phalanx fracture, no hardware complication    Impression: as above     Bryson Temple MD         Assessment:   1. Displaced fracture of base of fifth metacarpal bone, right hand, initial encounter for closed fracture    2. Closed displaced fracture of proximal phalanx of right little finger, initial encounter         Status post FINGER CLOSED REDUCTION PINNING of the right small proximal phalanx - Right and ORIF right 5th metacarpal fracture - Right on 4/4/2024    Plan:  We discussed the post operative course and progression.  Activity as tolerated, he may resume work with no lifting, pushing or pulling more than 2 pounds for the next 2 months, I will reassess the patient in 2 months and determine further care, he has severe stiffness of the MCP and PIP joints so it is very likely that he may need a

## 2024-05-24 ENCOUNTER — TELEPHONE (OUTPATIENT)
Dept: ORTHOPEDIC SURGERY | Age: 54
End: 2024-05-24

## 2024-05-24 NOTE — TELEPHONE ENCOUNTER
I returned patient call. I let him know that he may resume all activities including driving at this time including driving.

## 2024-05-29 ENCOUNTER — TELEPHONE (OUTPATIENT)
Dept: ORTHOPEDIC SURGERY | Age: 54
End: 2024-05-29

## 2024-05-29 NOTE — TELEPHONE ENCOUNTER
Mr. Dumont is not doing great in therapy. He is having a lot of pain and cannot do much of anything. His ROM has actually gotten worse. He says his hand is numb and tingling all the time. She will fax the evaluation shortly but wants to know what else Dr. Temple suggest. Please give her a call.

## 2024-05-30 ENCOUNTER — HOSPITAL ENCOUNTER (EMERGENCY)
Age: 54
Discharge: HOME | End: 2024-05-30
Attending: EMERGENCY MEDICINE
Payer: COMMERCIAL

## 2024-05-30 ENCOUNTER — APPOINTMENT (OUTPATIENT)
Dept: GENERAL RADIOLOGY | Age: 54
End: 2024-05-30
Payer: COMMERCIAL

## 2024-05-30 VITALS
RESPIRATION RATE: 18 BRPM | WEIGHT: 230 LBS | DIASTOLIC BLOOD PRESSURE: 105 MMHG | OXYGEN SATURATION: 95 % | HEART RATE: 82 BPM | BODY MASS INDEX: 31.19 KG/M2 | TEMPERATURE: 97.9 F | SYSTOLIC BLOOD PRESSURE: 146 MMHG

## 2024-05-30 DIAGNOSIS — R42 LIGHTHEADEDNESS: ICD-10-CM

## 2024-05-30 DIAGNOSIS — G56.01 CARPAL TUNNEL SYNDROME OF RIGHT WRIST: Primary | ICD-10-CM

## 2024-05-30 LAB
ANION GAP SERPL CALC-SCNC: 12 MMOL/L (ref 2–11)
BASOPHILS # BLD: 0 K/UL (ref 0–0.2)
BASOPHILS NFR BLD: 0 % (ref 0–2)
BUN SERPL-MCNC: 19 MG/DL (ref 6–23)
CALCIUM SERPL-MCNC: 9.5 MG/DL (ref 8.3–10.4)
CHLORIDE SERPL-SCNC: 103 MMOL/L (ref 98–107)
CO2 SERPL-SCNC: 27 MMOL/L (ref 21–32)
CREAT SERPL-MCNC: 1.09 MG/DL (ref 0.8–1.5)
DIFFERENTIAL METHOD BLD: ABNORMAL
EOSINOPHIL # BLD: 0.1 K/UL (ref 0–0.8)
EOSINOPHIL NFR BLD: 2 % (ref 0.5–7.8)
ERYTHROCYTE [DISTWIDTH] IN BLOOD BY AUTOMATED COUNT: 13 % (ref 11.9–14.6)
GLUCOSE SERPL-MCNC: 90 MG/DL (ref 65–100)
HCT VFR BLD AUTO: 34.2 % (ref 41.1–50.3)
HGB BLD-MCNC: 11.4 G/DL (ref 13.6–17.2)
IMM GRANULOCYTES # BLD AUTO: 0 K/UL (ref 0–0.5)
IMM GRANULOCYTES NFR BLD AUTO: 0 % (ref 0–5)
LYMPHOCYTES # BLD: 3 K/UL (ref 0.5–4.6)
LYMPHOCYTES NFR BLD: 42 % (ref 13–44)
MAGNESIUM SERPL-MCNC: 1.9 MG/DL (ref 1.2–2.6)
MCH RBC QN AUTO: 32.1 PG (ref 26.1–32.9)
MCHC RBC AUTO-ENTMCNC: 33.3 G/DL (ref 31.4–35)
MCV RBC AUTO: 96.3 FL (ref 82–102)
MONOCYTES # BLD: 0.6 K/UL (ref 0.1–1.3)
MONOCYTES NFR BLD: 9 % (ref 4–12)
NEUTS SEG # BLD: 3.3 K/UL (ref 1.7–8.2)
NEUTS SEG NFR BLD: 47 % (ref 43–78)
NRBC # BLD: 0 K/UL (ref 0–0.2)
PLATELET # BLD AUTO: 261 K/UL (ref 150–450)
PMV BLD AUTO: 10.1 FL (ref 9.4–12.3)
POTASSIUM SERPL-SCNC: 4.2 MMOL/L (ref 3.5–5.1)
RBC # BLD AUTO: 3.55 M/UL (ref 4.23–5.6)
SODIUM SERPL-SCNC: 142 MMOL/L (ref 133–143)
WBC # BLD AUTO: 7.1 K/UL (ref 4.3–11.1)

## 2024-05-30 PROCEDURE — 83735 ASSAY OF MAGNESIUM: CPT

## 2024-05-30 PROCEDURE — 96374 THER/PROPH/DIAG INJ IV PUSH: CPT

## 2024-05-30 PROCEDURE — 85025 COMPLETE CBC W/AUTO DIFF WBC: CPT

## 2024-05-30 PROCEDURE — 71045 X-RAY EXAM CHEST 1 VIEW: CPT

## 2024-05-30 PROCEDURE — 99285 EMERGENCY DEPT VISIT HI MDM: CPT

## 2024-05-30 PROCEDURE — 80048 BASIC METABOLIC PNL TOTAL CA: CPT

## 2024-05-30 PROCEDURE — 6360000002 HC RX W HCPCS: Performed by: EMERGENCY MEDICINE

## 2024-05-30 PROCEDURE — 2580000003 HC RX 258: Performed by: EMERGENCY MEDICINE

## 2024-05-30 PROCEDURE — 93005 ELECTROCARDIOGRAM TRACING: CPT | Performed by: EMERGENCY MEDICINE

## 2024-05-30 RX ORDER — METHYLPREDNISOLONE 4 MG/1
TABLET ORAL
Qty: 1 KIT | Refills: 0 | Status: SHIPPED | OUTPATIENT
Start: 2024-05-30

## 2024-05-30 RX ORDER — DEXAMETHASONE SODIUM PHOSPHATE 10 MG/ML
6 INJECTION INTRAMUSCULAR; INTRAVENOUS
Status: COMPLETED | OUTPATIENT
Start: 2024-05-30 | End: 2024-05-30

## 2024-05-30 RX ORDER — 0.9 % SODIUM CHLORIDE 0.9 %
1000 INTRAVENOUS SOLUTION INTRAVENOUS
Status: COMPLETED | OUTPATIENT
Start: 2024-05-30 | End: 2024-05-30

## 2024-05-30 RX ADMIN — DEXAMETHASONE SODIUM PHOSPHATE 6 MG: 10 INJECTION INTRAMUSCULAR; INTRAVENOUS at 17:33

## 2024-05-30 RX ADMIN — SODIUM CHLORIDE 1000 ML: 9 INJECTION, SOLUTION INTRAVENOUS at 17:57

## 2024-05-30 ASSESSMENT — PAIN - FUNCTIONAL ASSESSMENT: PAIN_FUNCTIONAL_ASSESSMENT: 0-10

## 2024-05-30 ASSESSMENT — PAIN SCALES - GENERAL: PAINLEVEL_OUTOF10: 10

## 2024-05-30 NOTE — ED TRIAGE NOTES
Pt hand a broken hand on April 1st and hand surgery on April 4th to repair it. Pt states he has had pain and numbness in his right hand since. Pt states he gets pins and needles up his arms at times. Pt also saying he is having fainting spells with blood pressure problems. Pt states he last fainted last night. Pt states PT and doc is aware of problems.

## 2024-05-30 NOTE — ED PROVIDER NOTES
Emergency Department Provider Note       PCP: Ericka Gonzalez APRN - NP   Age: 54 y.o.   Sex: male     DISPOSITION Decision To Discharge 05/30/2024 06:47:04 PM       ICD-10-CM    1. Carpal tunnel syndrome of right wrist  G56.01     Possible early      2. Lightheadedness  R42           Medical Decision Making     DDX:    TIA, CVA, syncope, near syncope, orthostatic hypotension,    tension headache, migraine headache, brain tumor, cluster headache, sinusitis    electrolyte abnormality, renal failure, malignant hypertension, UTI    Sprain, strain, tendon injury, contusion,    Abrasion, laceration, neurovascular injury, foreign body    Fracture, open fracture, dislocation, joint separation, articular surface injury,    ED Course as of 05/30/24 1848   Thu May 30, 2024   1755 Patient got lightheaded from lying to seated position but his blood pressure went up from seated to standing.  He did not have any repeat syncopal events. [KH]   1846 I talked the patient about the findings in the emergency department.  We talked about using ice and steroids for the pain in his wrist and making sure that he stands for at least a minute when he goes from a seated to standing position before moving.  The patient will monitor this closely and make sure he is drinking plenty of water. [KH]      ED Course User Index  [KH] Chay Dial, DO     1 or more acute illnesses that pose a threat to life or bodily function.   Prescription drug management performed.  Shared medical decision making was utilized in creating the patients health plan today.    I independently ordered and reviewed each unique test.  I reviewed external records: ED visit note from an outside group.  I reviewed external records: provider visit note from PCP.  I reviewed external records: provider visit note from outside specialist.  I reviewed external records: previous EKG including cardiologist interpretation.    I reviewed external records: previous lab

## 2024-05-30 NOTE — DISCHARGE INSTRUCTIONS
Take the full course of steroids as prescribed at home and keep your appointment with your hand specialist on Wednesday.    Make sure when you go from a lying or seated position to a standing position you stay in place for at least 1 to 2 minutes to allow your body time to acclimatized to the new position.  Try make sure you are drinking at least a liter of water daily.  If you start developing any new or concerning symptoms repeat evaluation this week for repeat evaluation.

## 2024-05-31 LAB
EKG ATRIAL RATE: 74 BPM
EKG DIAGNOSIS: NORMAL
EKG P AXIS: 268 DEGREES
EKG P-R INTERVAL: 98 MS
EKG Q-T INTERVAL: 392 MS
EKG QRS DURATION: 89 MS
EKG QTC CALCULATION (BAZETT): 435 MS
EKG R AXIS: 70 DEGREES
EKG T AXIS: -40 DEGREES
EKG VENTRICULAR RATE: 74 BPM

## 2024-06-05 ENCOUNTER — OFFICE VISIT (OUTPATIENT)
Age: 54
End: 2024-06-05

## 2024-06-05 ENCOUNTER — TELEPHONE (OUTPATIENT)
Dept: ORTHOPEDIC SURGERY | Age: 54
End: 2024-06-05

## 2024-06-05 DIAGNOSIS — S62.316A DISPLACED FRACTURE OF BASE OF FIFTH METACARPAL BONE, RIGHT HAND, INITIAL ENCOUNTER FOR CLOSED FRACTURE: ICD-10-CM

## 2024-06-05 DIAGNOSIS — R29.898 WEAKNESS OF RIGHT SHOULDER: Primary | ICD-10-CM

## 2024-06-05 DIAGNOSIS — G56.01 RIGHT CARPAL TUNNEL SYNDROME: ICD-10-CM

## 2024-06-05 RX ORDER — AMITRIPTYLINE HYDROCHLORIDE 25 MG/1
25 TABLET, FILM COATED ORAL NIGHTLY
Qty: 30 TABLET | Refills: 0 | Status: SHIPPED | OUTPATIENT
Start: 2024-06-05 | End: 2024-07-17

## 2024-06-05 RX ORDER — ASCORBIC ACID 500 MG
500 TABLET ORAL DAILY
Qty: 42 TABLET | Refills: 0 | Status: SHIPPED | OUTPATIENT
Start: 2024-06-05 | End: 2024-07-17

## 2024-06-05 RX ORDER — BETAMETHASONE SODIUM PHOSPHATE AND BETAMETHASONE ACETATE 3; 3 MG/ML; MG/ML
6 INJECTION, SUSPENSION INTRA-ARTICULAR; INTRALESIONAL; INTRAMUSCULAR; SOFT TISSUE ONCE
Status: COMPLETED | OUTPATIENT
Start: 2024-06-05 | End: 2024-06-05

## 2024-06-05 RX ORDER — MELOXICAM 15 MG/1
15 TABLET ORAL DAILY
Qty: 28 TABLET | Refills: 0 | Status: SHIPPED | OUTPATIENT
Start: 2024-06-05 | End: 2024-07-03

## 2024-06-05 RX ADMIN — BETAMETHASONE SODIUM PHOSPHATE AND BETAMETHASONE ACETATE 6 MG: 3; 3 INJECTION, SUSPENSION INTRA-ARTICULAR; INTRALESIONAL; INTRAMUSCULAR; SOFT TISSUE at 10:15

## 2024-06-05 NOTE — TELEPHONE ENCOUNTER
I spoke with the pharmacy and patient. Advised to discontinue vitamin d2 and start taking vitamin d3 for 6 weeks. Mr. Dumont undertstands to call back with any questions or concerns.

## 2024-06-05 NOTE — TELEPHONE ENCOUNTER
Beto is calling to verify that he needs to get the Vitamin D3 because he is currently taking Vitamin D2 from another doctor.

## 2024-06-06 NOTE — PROGRESS NOTES
Patient was fitted and instructed on an  Wrist Splint for patients right wrist. Patient is aware the hand slides in the brace with the thumb placed threw the thumb hole. I demonstrated the correct way to tighten the brace straps to allow for a comfortable fit. Patient understood the correct way to wear the brace.    Patient read and signed documenting they understand and agree to [facility]'s current DME return policy.   
metacarpal bone, right hand, initial encounter for closed fracture    2. Right carpal tunnel syndrome         Status post FINGER CLOSED REDUCTION PINNING of the right small proximal phalanx - Right and ORIF right 5th metacarpal fracture - Right on 4/4/2024    Plan:  We discussed the post operative course and progression.  Continue steroids as prescribed by the emergency department, I will prescribe Mobic 15 daily for 4 weeks, Elavil 50 mg nightly for pain, vitamin C 500 mg daily for 6 weeks, vitamin D 50,000 weekly for 6 weeks, I also performed a right carpal tunnel steroid injection today, I believe he may be having carpal tunnel symptoms aggravated by swelling from the injury and the fracture and this is what is causing his pain and numbness as well as night symptoms.  I will reassess in 4 weeks, hopefully we can do a nerve conduction study before then.  He will remain out of work for the next 4 weeks.  I will also refer the patient to shoulder surgery for evaluation, during the initial injury he felt the whole arm was yanked, it is quite possible that he sustained a shoulder injury or even a brachial plexus injury, the nerve conduction study will also help us assess the brachial plexus injury    Procedure Note    The risk, benefits and alternatives of injection and no injection therapy were discussed. The patient consented for an injection. The patient has been identified by name and birthdate. The injection site was identified, marked and prepped with alcohol swabs. Time out completed. The Right carpal tunnel was injected with 1ml of 6mg/ml Betamethasone and 1ml Lidocaine plain 1%. The injection site was then dressed with a bandaid. The patient tolerated the injection well. The patient was instructed to monitor their blood sugars if diabetic and call if any concerns. The patient was instructed to call the office if any adverse local effects occurred or any if any questions or concerns arise.         Bryson

## 2024-06-10 ENCOUNTER — TELEPHONE (OUTPATIENT)
Dept: ORTHOPEDIC SURGERY | Age: 54
End: 2024-06-10

## 2024-06-10 DIAGNOSIS — G56.01 RIGHT CARPAL TUNNEL SYNDROME: Primary | ICD-10-CM

## 2024-06-10 NOTE — TELEPHONE ENCOUNTER
His hand therapist at [facility] wanted to let you know the patient has cancelled his last few therapy visits and she wants to follow up and see if there is anything different she needs to be doing or if it's okay that he has cancelled. Please call to discuss.

## 2024-06-11 NOTE — TELEPHONE ENCOUNTER
Spoke to the patient's wife, she is concerned the therapy is increasing his pain. I advised her to make sure the patient is communicating with the therapist regarding pain during therapy. He will continue his medication that was given to him, if he runs out before the EMG gets approved and scheduled they will call back to get a refill

## 2024-07-03 ENCOUNTER — TELEPHONE (OUTPATIENT)
Dept: ORTHOPEDIC SURGERY | Age: 54
End: 2024-07-03

## 2024-07-16 ENCOUNTER — OFFICE VISIT (OUTPATIENT)
Age: 54
End: 2024-07-16

## 2024-07-16 DIAGNOSIS — S62.316A DISPLACED FRACTURE OF BASE OF FIFTH METACARPAL BONE, RIGHT HAND, INITIAL ENCOUNTER FOR CLOSED FRACTURE: Primary | ICD-10-CM

## 2024-07-18 ENCOUNTER — TELEPHONE (OUTPATIENT)
Dept: ORTHOPEDIC SURGERY | Age: 54
End: 2024-07-18

## 2024-07-18 DIAGNOSIS — S62.316A DISPLACED FRACTURE OF BASE OF FIFTH METACARPAL BONE, RIGHT HAND, INITIAL ENCOUNTER FOR CLOSED FRACTURE: Primary | ICD-10-CM

## 2024-07-18 NOTE — PROGRESS NOTES
Patient's EMG has not been approved by worker's comp, a updated work note was given and the patient's apt has been cxled until test has been completed

## 2024-07-18 NOTE — TELEPHONE ENCOUNTER
[facility] is calling because they received the referral but it's for OT and it needs to be for PT. Please correct and resend to [phone]

## 2024-07-18 NOTE — TELEPHONE ENCOUNTER
[facility] is calling to see if this patient needs to continue PT and if so they will need the new referral. He will be out of visits after today. You can fax it to [phone]

## 2024-09-20 ENCOUNTER — TELEPHONE (OUTPATIENT)
Dept: ORTHOPEDIC SURGERY | Age: 54
End: 2024-09-20

## 2024-09-20 ENCOUNTER — OFFICE VISIT (OUTPATIENT)
Age: 54
End: 2024-09-20

## 2024-09-20 DIAGNOSIS — S62.316A DISPLACED FRACTURE OF BASE OF FIFTH METACARPAL BONE, RIGHT HAND, INITIAL ENCOUNTER FOR CLOSED FRACTURE: Primary | ICD-10-CM

## 2024-09-20 DIAGNOSIS — G56.01 RIGHT CARPAL TUNNEL SYNDROME: ICD-10-CM

## 2025-04-01 ENCOUNTER — TELEPHONE (OUTPATIENT)
Dept: ORTHOPEDIC SURGERY | Age: 55
End: 2025-04-01

## 2025-04-01 DIAGNOSIS — S62.316A DISPLACED FRACTURE OF BASE OF FIFTH METACARPAL BONE, RIGHT HAND, INITIAL ENCOUNTER FOR CLOSED FRACTURE: Primary | ICD-10-CM

## 2025-04-01 DIAGNOSIS — G56.01 RIGHT CARPAL TUNNEL SYNDROME: ICD-10-CM

## 2025-04-04 DIAGNOSIS — G56.01 RIGHT CARPAL TUNNEL SYNDROME: Primary | ICD-10-CM

## 2025-04-04 DIAGNOSIS — S62.316A DISPLACED FRACTURE OF BASE OF FIFTH METACARPAL BONE, RIGHT HAND, INITIAL ENCOUNTER FOR CLOSED FRACTURE: ICD-10-CM

## 2025-04-07 ENCOUNTER — OFFICE VISIT (OUTPATIENT)
Age: 55
End: 2025-04-07
Payer: COMMERCIAL

## 2025-04-07 DIAGNOSIS — S62.616A CLOSED DISPLACED FRACTURE OF PROXIMAL PHALANX OF RIGHT LITTLE FINGER, INITIAL ENCOUNTER: ICD-10-CM

## 2025-04-07 DIAGNOSIS — S62.316A DISPLACED FRACTURE OF BASE OF FIFTH METACARPAL BONE, RIGHT HAND, INITIAL ENCOUNTER FOR CLOSED FRACTURE: Primary | ICD-10-CM

## 2025-04-07 DIAGNOSIS — G56.01 RIGHT CARPAL TUNNEL SYNDROME: ICD-10-CM

## 2025-04-07 PROCEDURE — 99214 OFFICE O/P EST MOD 30 MIN: CPT | Performed by: ORTHOPAEDIC SURGERY

## 2025-04-07 NOTE — PROGRESS NOTES
Orthopedic Hand Surgery Note    Messi Dumont  1970  male    History of Present Illness  The patient is status post right fifth metacarpal open reduction and internal fixation, as well as right small finger proximal phalanx open reduction and internal fixation with extensor tendon repair. He continues to experience significant stiffness, numbness, and paresthesias in the median distribution. He is scheduled for surgery tomorrow.    Physical Exam  Positive provocative maneuvers for carpal tunnel syndrome are observed in the musculoskeletal system. There is a flexion contracture of the PIP joint at 15 degrees. Adhesions at the extensor tendon are noted with limited  strength at 75 on the right versus 84 on the left. Active and passive limited motion of the MCP joint is observed with about 70 degrees actively and 75 degrees passively.    Imaging/NCS    Nerve conduction study was reviewed, this demonstrates moderate carpal tunnel syndrome with sensory latency of 3.9, motor latency of 4.1, normal EMG    Visit Diagnoses         Codes      Right carpal tunnel syndrome     G56.01          Assessment & Plan  1. Postoperative status following open reduction and internal fixation of the right fifth metacarpal and proximal phalanx of the right small finger, accompanied by extensor tendon repair.  He continues to have significant stiffness, numbness, and paresthesias in the median distribution. On physical examination, he has positive provocative maneuvers for carpal tunnel syndrome, flexion contracture of the PIP joint at 15 degrees, adhesions at the extensor tendon with limited  strength at 75 on the right versus 84 on the left, and limited motion of the MCP joint with about 70 degrees actively and 75 degrees passively. All treatment options were discussed in detail, and he would like to proceed with surgery. Surgery is expected to improve his  strength, motion, and resolve the numbness and

## 2025-04-08 ENCOUNTER — OUTSIDE SERVICES (OUTPATIENT)
Age: 55
End: 2025-04-08

## 2025-04-08 DIAGNOSIS — T85.848A PAIN FROM IMPLANTED HARDWARE, INITIAL ENCOUNTER: ICD-10-CM

## 2025-04-08 DIAGNOSIS — S62.316A DISPLACED FRACTURE OF BASE OF FIFTH METACARPAL BONE, RIGHT HAND, INITIAL ENCOUNTER FOR CLOSED FRACTURE: Primary | ICD-10-CM

## 2025-04-08 DIAGNOSIS — G56.01 RIGHT CARPAL TUNNEL SYNDROME: ICD-10-CM

## 2025-04-08 RX ORDER — ONDANSETRON 4 MG/1
4 TABLET, FILM COATED ORAL EVERY 8 HOURS PRN
Qty: 20 TABLET | Refills: 0 | Status: SHIPPED | OUTPATIENT
Start: 2025-04-08

## 2025-04-08 RX ORDER — OXYCODONE HYDROCHLORIDE 5 MG/1
5 TABLET ORAL EVERY 6 HOURS PRN
Qty: 20 TABLET | Refills: 0 | Status: SHIPPED | OUTPATIENT
Start: 2025-04-08 | End: 2025-04-13

## 2025-04-08 NOTE — OP NOTE
Hand Surgery Operative Note      Messi Dumont   54 y.o.   male      Pre-op diagnosis:   1. Displaced fracture of base of fifth metacarpal bone, right hand, initial encounter for closed fracture    2. Right carpal tunnel syndrome    3. Pain from implanted hardware, initial encounter      Post op diagnosis: same      Procedure: Right Ultrasound-Guided Carpal Tunnel release, right hand hardware removal, right 5th MCP capsulectomy, right small finger and DIP metacarpal complex tenolysis with removal of adhesions from the collateral ligaments and the extensor tian cpt 07635, 57437, 14447, 25500, 82068     Surgeon: Bryson Temple Jr, MD, PhD      Anesthesia: Plexus     Procedure indications: Patient with radial digit numbness recalcitrant to conservative measures with positive documentation of NCV findings consistent with carpal tunnel syndrome. Pain and stiffness from implanted hardware and tendon adhesions. After Thorough discussion, the patient decided to proceed with surgical management. We discussed in detail surgical risks including scar, pain, bleeding, infection, anesthetic risks, neurovascular injury, need for further surgery,  weakness, stiffness, risk of death and potential risk of other unforseen complication.      Procedure description:      Patient was placed in the supine position and after appropriate time-out and side, site and procedure confirmed.     Using a sterile ultrasound cover and sterile gel, relevant anatomical landmarks were identified, including but not limited to the median nerve, thenar motor branch/recurrent motor branch of the median nerve, palmar cutaneous branch of the median nerve, median and ulnar digital nerves and any visualized communications, ulnar vessels and superficial palmar arch, palmar fascia and distal transverse carpal ligament. A sterile ink marker was used to kael the borders of the transverse and longitudinal safe zones as well as the incision site in the proximal

## 2025-04-10 ENCOUNTER — EVALUATION (OUTPATIENT)
Age: 55
End: 2025-04-10
Payer: COMMERCIAL

## 2025-04-10 DIAGNOSIS — S62.617A DISPLACED FRACTURE OF PROXIMAL PHALANX OF LEFT LITTLE FINGER, INITIAL ENCOUNTER FOR CLOSED FRACTURE: ICD-10-CM

## 2025-04-10 DIAGNOSIS — S62.316A DISPLACED FRACTURE OF BASE OF FIFTH METACARPAL BONE, RIGHT HAND, INITIAL ENCOUNTER FOR CLOSED FRACTURE: ICD-10-CM

## 2025-04-10 DIAGNOSIS — S62.316A CLOSED DISPLACED FRACTURE OF BASE OF FIFTH METACARPAL BONE OF RIGHT HAND, INITIAL ENCOUNTER: ICD-10-CM

## 2025-04-10 DIAGNOSIS — M25.60 DECREASED RANGE OF MOTION: ICD-10-CM

## 2025-04-10 DIAGNOSIS — Z78.9 DECREASED ACTIVITIES OF DAILY LIVING (ADL): ICD-10-CM

## 2025-04-10 DIAGNOSIS — M25.641 STIFFNESS OF FINGER JOINT OF RIGHT HAND: Primary | ICD-10-CM

## 2025-04-10 PROCEDURE — 97166 OT EVAL MOD COMPLEX 45 MIN: CPT | Performed by: OCCUPATIONAL THERAPIST

## 2025-04-10 PROCEDURE — 97110 THERAPEUTIC EXERCISES: CPT | Performed by: OCCUPATIONAL THERAPIST

## 2025-04-10 PROCEDURE — M5042: HCPCS | Performed by: OCCUPATIONAL THERAPIST

## 2025-04-10 NOTE — PROGRESS NOTES
[facility] ORTHOPAED[facility]  [address] [place] [id number]  Dept: [phone]      Occupational Therapy Initial Assessment     Referring MD: Bryson Temple MD    Diagnosis:     ICD-10-CM    1. Stiffness of finger joint of right hand  M25.641       2. Decreased range of motion  M25.60       3. Decreased activities of daily living (ADL)  Z78.9       4. Closed displaced fracture of base of fifth metacarpal bone of right hand, initial encounter  S62.316A       5. Displaced fracture of proximal phalanx of left little finger, initial encounter for closed fracture  S62.617A       6. Displaced fracture of base of fifth metacarpal bone, right hand, initial encounter for closed fracture [S62.316A]  S62.316A            Surgery: Date Right Ultrasound-Guided Carpal Tunnel release, right hand hardware removal, right 5th MCP capsulectomy, right small finger and DIP metacarpal complex tenolysis with removal of adhesions from the collateral ligaments and the extensor tian 4/8/25     Therapy precautions: None    History of injury/onset : Pt had surgery on his right hand back in     Payor: Payor: [employer] /  /  /  Billing pattern: Workers comp- substantial/midpoint time each CPT  Total Direct Treatment Time: 30 min                       Total In Office Time: 55 min  Modifier needed: No  Episode visit count:  1     Preferred Name:  Messi    PERTINENT MEDICAL HISTORY     PMHX & Meds:   Past Medical History:   Diagnosis Date    ED (erectile dysfunction)     Hypertension     medication    Obesity     patient stated taking mounjaro for wt loss   ,   Past Surgical History:   Procedure Laterality Date    FINGER CLOSED REDUCTION Right 4/4/2024    FINGER CLOSED REDUCTION PINNING of the right small proximal phalanx performed by Bryson Temple MD at [facility]    HAND SURGERY Right 4/4/2024    ORIF right 5th metacarpal fracture performed by Bryson Temple MD at [facility]      Medications. : Reviewed in chart  Allergies: No

## 2025-04-14 NOTE — PROGRESS NOTES
[facility] ORTHOPAEDICS  [address] [place] [phone]  Dept: [phone]      Occupational Therapy Daily Note     Referring MD: Bryson Temple MD    Diagnosis:     ICD-10-CM    1. Stiffness of finger joint of right hand  M25.641       2. Decreased range of motion  M25.60       3. Decreased activities of daily living (ADL)  Z78.9       4. Closed displaced fracture of base of fifth metacarpal bone of right hand, initial encounter  S62.316A       5. Displaced fracture of proximal phalanx of left little finger, initial encounter for closed fracture  S62.617A       6. Displaced fracture of base of fifth metacarpal bone, right hand, initial encounter for closed fracture [S62.316A]  S62.316A            Surgery: Date Right Ultrasound-Guided Carpal Tunnel release, right hand hardware removal, right 5th MCP capsulectomy, right small finger and DIP metacarpal complex tenolysis with removal of adhesions from the collateral ligaments and the extensor tian 4/8/25     Therapy precautions: None    History of injury/onset : Pt had surgery on his right hand back in     Payor: Payor: [employer] /  /  /  Billing pattern: Workers comp- substantial/midpoint time each CPT  Total Direct Treatment Time: 40 min                       Total In Office Time: 50 min  Modifier needed: No  Episode visit count:  2     Preferred Name:  Messi    SUBJECTIVE     Pt states that he has noticed great improvements with his small finger motion since last visit.     OBJECTIVE     Functional Outcome Measures: Quick Dash  51 score=   90.9 % functional deficit  Hand/Side Dominance: right handed  Observation/Posture: Holding UE in protected position, muscle guarding   Palpation: TTP over 5th metacarpal   Swelling/Edema: moderate 23 cm R Metacarpals 22cm L   Skin Integrity:  stitches intact, incision clean and dry, no drainage noted       A/PROM MEASUREMENTS    Screenings:  IF and MF only slightly limited with full flexion due to

## 2025-04-15 ENCOUNTER — TREATMENT (OUTPATIENT)
Age: 55
End: 2025-04-15
Payer: COMMERCIAL

## 2025-04-15 DIAGNOSIS — S62.316A CLOSED DISPLACED FRACTURE OF BASE OF FIFTH METACARPAL BONE OF RIGHT HAND, INITIAL ENCOUNTER: ICD-10-CM

## 2025-04-15 DIAGNOSIS — M25.641 STIFFNESS OF FINGER JOINT OF RIGHT HAND: Primary | ICD-10-CM

## 2025-04-15 DIAGNOSIS — S62.617A DISPLACED FRACTURE OF PROXIMAL PHALANX OF LEFT LITTLE FINGER, INITIAL ENCOUNTER FOR CLOSED FRACTURE: ICD-10-CM

## 2025-04-15 DIAGNOSIS — Z78.9 DECREASED ACTIVITIES OF DAILY LIVING (ADL): ICD-10-CM

## 2025-04-15 DIAGNOSIS — M25.60 DECREASED RANGE OF MOTION: ICD-10-CM

## 2025-04-15 DIAGNOSIS — S62.316A DISPLACED FRACTURE OF BASE OF FIFTH METACARPAL BONE, RIGHT HAND, INITIAL ENCOUNTER FOR CLOSED FRACTURE: ICD-10-CM

## 2025-04-15 PROCEDURE — 97763 ORTHC/PROSTC MGMT SBSQ ENC: CPT | Performed by: OCCUPATIONAL THERAPIST

## 2025-04-15 PROCEDURE — 97110 THERAPEUTIC EXERCISES: CPT | Performed by: OCCUPATIONAL THERAPIST

## 2025-04-15 PROCEDURE — 97010 HOT OR COLD PACKS THERAPY: CPT | Performed by: OCCUPATIONAL THERAPIST

## 2025-04-17 ENCOUNTER — TREATMENT (OUTPATIENT)
Age: 55
End: 2025-04-17

## 2025-04-17 DIAGNOSIS — Z78.9 DECREASED ACTIVITIES OF DAILY LIVING (ADL): ICD-10-CM

## 2025-04-17 DIAGNOSIS — M25.60 DECREASED RANGE OF MOTION: ICD-10-CM

## 2025-04-17 DIAGNOSIS — S62.617A DISPLACED FRACTURE OF PROXIMAL PHALANX OF LEFT LITTLE FINGER, INITIAL ENCOUNTER FOR CLOSED FRACTURE: ICD-10-CM

## 2025-04-17 DIAGNOSIS — S62.316A CLOSED DISPLACED FRACTURE OF BASE OF FIFTH METACARPAL BONE OF RIGHT HAND, INITIAL ENCOUNTER: ICD-10-CM

## 2025-04-17 DIAGNOSIS — M25.641 STIFFNESS OF FINGER JOINT OF RIGHT HAND: Primary | ICD-10-CM

## 2025-04-17 DIAGNOSIS — S62.316A DISPLACED FRACTURE OF BASE OF FIFTH METACARPAL BONE, RIGHT HAND, INITIAL ENCOUNTER FOR CLOSED FRACTURE: ICD-10-CM

## 2025-04-17 NOTE — PROGRESS NOTES
proprioception  Modalities prn to address pain, spasms, tissue extensibility and swelling:(06659) Electrical stimulation - attended  (72828) Ultrasound/phonophoresis  (98095) Hot/cold pack  (08818) Fluidotherapy  (65075) Paraffin    The referring medical provider has reviewed and approved this evaluation and plan of care as noted by the electronic signature attached to note.    GOALS     Short term goals: 5/12/2025  (4 weeks)  Patient will be I with HEP and precautions.  Increase AROM of affected SF MP flexion to at least 70 ° to improve ability to grasp.  Increase AROM of affected SF MP extension to at least 0 ° to improve ability to open hand to obtain objects.  Increase AROM of affected SF PIP flexion to at least 85 ° to improve ability to grasp.  Increase AROM of affected SF DIP flexion to at least 45 ° to improve ability to grasp.  Decrease edema in affected digit by 0.5 cm to improve motion.  Improve Quick DASH functional assessment score from less than 50% deficit.    Long term goals: 7/7/2025  (12 weeks)   Pt will be able to use the affected UE for all ADL's without difficulty.  Pt will have adequate strength to be able to hold and open containers without difficulty.  Pt will be able to make a full composite fist with the involved hand to enable to grasp and hold objects.  Minimal edema in involved digit.   Improve Quick DASH functional assessment score from less than 20% deficit.      [facility] Portal     OT Protocols

## 2025-04-21 ENCOUNTER — OFFICE VISIT (OUTPATIENT)
Age: 55
End: 2025-04-21

## 2025-04-21 ENCOUNTER — TREATMENT (OUTPATIENT)
Age: 55
End: 2025-04-21
Payer: COMMERCIAL

## 2025-04-21 DIAGNOSIS — M25.60 DECREASED RANGE OF MOTION: ICD-10-CM

## 2025-04-21 DIAGNOSIS — S62.316A DISPLACED FRACTURE OF BASE OF FIFTH METACARPAL BONE, RIGHT HAND, INITIAL ENCOUNTER FOR CLOSED FRACTURE: Primary | ICD-10-CM

## 2025-04-21 DIAGNOSIS — G56.01 RIGHT CARPAL TUNNEL SYNDROME: ICD-10-CM

## 2025-04-21 DIAGNOSIS — S62.617A DISPLACED FRACTURE OF PROXIMAL PHALANX OF LEFT LITTLE FINGER, INITIAL ENCOUNTER FOR CLOSED FRACTURE: ICD-10-CM

## 2025-04-21 DIAGNOSIS — Z78.9 DECREASED ACTIVITIES OF DAILY LIVING (ADL): ICD-10-CM

## 2025-04-21 DIAGNOSIS — S62.316A CLOSED DISPLACED FRACTURE OF BASE OF FIFTH METACARPAL BONE OF RIGHT HAND, INITIAL ENCOUNTER: ICD-10-CM

## 2025-04-21 DIAGNOSIS — S62.316A DISPLACED FRACTURE OF BASE OF FIFTH METACARPAL BONE, RIGHT HAND, INITIAL ENCOUNTER FOR CLOSED FRACTURE: ICD-10-CM

## 2025-04-21 DIAGNOSIS — M25.641 STIFFNESS OF FINGER JOINT OF RIGHT HAND: Primary | ICD-10-CM

## 2025-04-21 PROCEDURE — 97022 WHIRLPOOL THERAPY: CPT | Performed by: OCCUPATIONAL THERAPIST

## 2025-04-21 PROCEDURE — 99024 POSTOP FOLLOW-UP VISIT: CPT | Performed by: ORTHOPAEDIC SURGERY

## 2025-04-21 PROCEDURE — 97110 THERAPEUTIC EXERCISES: CPT | Performed by: OCCUPATIONAL THERAPIST

## 2025-04-21 NOTE — PROGRESS NOTES
Orthopedic Hand Surgery Note    Messi Dumont  1970  male    History of Present Illness  The patient is status post right ultrasound-guided carpal tunnel release as well as right hand hardware removal and fifth MCP joint capsulectomy. He reports improved motion and resolution of numbness.    Physical Exam  The patient's fifth MCP motion in the musculoskeletal system has improved, but he is still unable to make a full fist with the small finger. He has a 1 cm tip to palm distance and well-healed surgical scars.      Visit Diagnoses         Codes      Right carpal tunnel syndrome     G56.01          Assessment & Plan  1. Postoperative status following right ultrasound-guided carpal tunnel release, right hand hardware removal, and fifth MCP joint capsulectomy.  He reports improved motion and resolution of numbness. On physical examination, he has improved fifth MCP motion but is still unable to make a full fist with the small finger, with a 1 cm tip to palm distance. Surgical scars are well healed. He will continue working on motion and strengthening in therapy. He will resume work with no lifting, pushing, or pulling more than 5 pounds for the next 6 weeks.    Follow-up  He will follow up in 6 weeks.    PROCEDURE  The patient underwent right ultrasound-guided carpal tunnel release, right hand hardware removal, and fifth MCP joint capsulectomy.    Bryson Rader Jr, MD, PhD  Orthopaedic Surgery  04/21/25  9:27 AM

## 2025-04-21 NOTE — PROGRESS NOTES
Neuromuscular reeducation addressing impaired coordination, kinesthetic sense, posture and proprioception  Modalities prn to address pain, spasms, tissue extensibility and swelling:(88244) Electrical stimulation - attended  (91439) Ultrasound/phonophoresis  (74348) Hot/cold pack  (21458) Fluidotherapy  (29262) Paraffin    The referring medical provider has reviewed and approved this evaluation and plan of care as noted by the electronic signature attached to note.    GOALS     Short term goals: 5/12/2025  (4 weeks)  Patient will be I with HEP and precautions.  Increase AROM of affected SF MP flexion to at least 70 ° to improve ability to grasp.  Increase AROM of affected SF MP extension to at least 0 ° to improve ability to open hand to obtain objects.  Increase AROM of affected SF PIP flexion to at least 85 ° to improve ability to grasp.  Increase AROM of affected SF DIP flexion to at least 45 ° to improve ability to grasp.  Decrease edema in affected digit by 0.5 cm to improve motion.  Improve Quick DASH functional assessment score from less than 50% deficit.    Long term goals: 7/7/2025  (12 weeks)   Pt will be able to use the affected UE for all ADL's without difficulty.  Pt will have adequate strength to be able to hold and open containers without difficulty.  Pt will be able to make a full composite fist with the involved hand to enable to grasp and hold objects.  Minimal edema in involved digit.   Improve Quick DASH functional assessment score from less than 20% deficit.      [facility] Portal     OT Protocols

## 2025-04-24 ENCOUNTER — TREATMENT (OUTPATIENT)
Age: 55
End: 2025-04-24
Payer: COMMERCIAL

## 2025-04-24 DIAGNOSIS — Z78.9 DECREASED ACTIVITIES OF DAILY LIVING (ADL): ICD-10-CM

## 2025-04-24 DIAGNOSIS — M25.641 STIFFNESS OF FINGER JOINT OF RIGHT HAND: ICD-10-CM

## 2025-04-24 DIAGNOSIS — M25.60 DECREASED RANGE OF MOTION: Primary | ICD-10-CM

## 2025-04-24 PROCEDURE — 97110 THERAPEUTIC EXERCISES: CPT

## 2025-04-24 PROCEDURE — 97022 WHIRLPOOL THERAPY: CPT

## 2025-04-24 NOTE — PROGRESS NOTES
[facility] ORTHOPAEDICS  [address] [place] [id number]  Dept: [phone]      Occupational Therapy Daily Note     Referring MD: Bryson Temple MD    Diagnosis:     ICD-10-CM    1. Decreased range of motion  M25.60       2. Stiffness of finger joint of right hand  M25.641       3. Decreased activities of daily living (ADL)  Z78.9              Surgery: Date Right Ultrasound-Guided Carpal Tunnel release, right hand hardware removal, right 5th MCP capsulectomy, right small finger and DIP metacarpal complex tenolysis with removal of adhesions from the collateral ligaments and the extensor tian 4/8/25     Therapy precautions: None    History of injury/onset : Pt had surgery on his right hand back in     Payor: Payor: [employer] /  /  /  Billing pattern: Workers comp- substantial/midpoint time each CPT  Total Direct Treatment Time: 40 min                       Total In Office Time: 50 min  Modifier needed: No  Episode visit count:  3     Preferred Name:  Messi LANE     Pt seen by Dr. Temple this morning. He states he has been fairly swollen over the back of his hand, he is still trying to do all exercises and stretching techniques at home.     OBJECTIVE     Functional Outcome Measures: Quick Dash  51 score=   90.9 % functional deficit  Hand/Side Dominance: right handed  Observation/Posture: Holding UE in protected position, muscle guarding   Palpation: TTP over 5th metacarpal   A/PROM MEASUREMENTS    Screenings:  IF and MF only slightly limited with full flexion due to pain     Digital AROM:  IE RF SF RF  4/17/25 SF  4/17/25   AROM    MCP 54°  45°   63   AROM      °  -50/75°   80   AROM      DIP 40°  30°   40   20/54 Wrist F/E    Strength  Strength (psi): RIGHT  4/21/25 LEFT  4/21/25      Position II 49 89          Treatment:    Fluidotherapy (28619) Therapist directed exercise in Fluidotherapy to right hand/wrist for preparation for tx and desensitization     Therapeutic

## 2025-04-28 NOTE — PROGRESS NOTES
[facility] ORTHOPAEDICS  [address] [place] [id number]  Dept: [phone]      Occupational Therapy Daily Note     Referring MD: Bryson Temple MD    Diagnosis:     ICD-10-CM    1. Decreased range of motion  M25.60       2. Stiffness of finger joint of right hand  M25.641       3. Decreased activities of daily living (ADL)  Z78.9       4. Closed displaced fracture of base of fifth metacarpal bone of right hand, initial encounter  S62.316A       5. Displaced fracture of proximal phalanx of left little finger, initial encounter for closed fracture  S62.617A       6. Displaced fracture of base of fifth metacarpal bone, right hand, initial encounter for closed fracture [S62.316A]  S62.316A              Surgery: Date Right Ultrasound-Guided Carpal Tunnel release, right hand hardware removal, right 5th MCP capsulectomy, right small finger and DIP metacarpal complex tenolysis with removal of adhesions from the collateral ligaments and the extensor tian 4/8/25     Therapy precautions: None    History of injury/onset : Pt had surgery on his right hand back in     Payor: Payor: [employer] /  /  /  Billing pattern: Workers comp- substantial/midpoint time each CPT  Total Direct Treatment Time: 40 min                       Total In Office Time: 50 min  Modifier needed: No  Episode visit count:  5     Preferred Name:  Messi    SUBJECTIVE     Pt reports he feels tight in the morning. He has been trying to stretch.     OBJECTIVE     Functional Outcome Measures: Quick Dash  51 score=   90.9 % functional deficit  Hand/Side Dominance: right handed  Observation/Posture: Holding UE in protected position, muscle guarding   Palpation: TTP over 5th metacarpal   A/PROM MEASUREMENTS    Screenings:  IF and MF only slightly limited with full flexion due to pain     Digital AROM:  IE RF SF RF  4/17/25 SF  4/17/25   AROM    MCP 54°  45°   63   AROM      °  -50/75°   80   AROM      DIP 40°  30°   40   20/54

## 2025-04-29 ENCOUNTER — TREATMENT (OUTPATIENT)
Age: 55
End: 2025-04-29
Payer: COMMERCIAL

## 2025-04-29 DIAGNOSIS — S62.617A DISPLACED FRACTURE OF PROXIMAL PHALANX OF LEFT LITTLE FINGER, INITIAL ENCOUNTER FOR CLOSED FRACTURE: ICD-10-CM

## 2025-04-29 DIAGNOSIS — S62.316A CLOSED DISPLACED FRACTURE OF BASE OF FIFTH METACARPAL BONE OF RIGHT HAND, INITIAL ENCOUNTER: ICD-10-CM

## 2025-04-29 DIAGNOSIS — Z78.9 DECREASED ACTIVITIES OF DAILY LIVING (ADL): ICD-10-CM

## 2025-04-29 DIAGNOSIS — S62.316A DISPLACED FRACTURE OF BASE OF FIFTH METACARPAL BONE, RIGHT HAND, INITIAL ENCOUNTER FOR CLOSED FRACTURE: ICD-10-CM

## 2025-04-29 DIAGNOSIS — M25.641 STIFFNESS OF FINGER JOINT OF RIGHT HAND: ICD-10-CM

## 2025-04-29 DIAGNOSIS — M25.60 DECREASED RANGE OF MOTION: Primary | ICD-10-CM

## 2025-04-29 PROCEDURE — 97110 THERAPEUTIC EXERCISES: CPT | Performed by: OCCUPATIONAL THERAPIST

## 2025-04-29 PROCEDURE — 97010 HOT OR COLD PACKS THERAPY: CPT | Performed by: OCCUPATIONAL THERAPIST

## 2025-05-01 ENCOUNTER — TREATMENT (OUTPATIENT)
Age: 55
End: 2025-05-01

## 2025-05-01 DIAGNOSIS — M25.641 STIFFNESS OF FINGER JOINT OF RIGHT HAND: ICD-10-CM

## 2025-05-01 DIAGNOSIS — S62.316A CLOSED DISPLACED FRACTURE OF BASE OF FIFTH METACARPAL BONE OF RIGHT HAND, INITIAL ENCOUNTER: ICD-10-CM

## 2025-05-01 DIAGNOSIS — S62.316A DISPLACED FRACTURE OF BASE OF FIFTH METACARPAL BONE, RIGHT HAND, INITIAL ENCOUNTER FOR CLOSED FRACTURE: ICD-10-CM

## 2025-05-01 DIAGNOSIS — M25.60 DECREASED RANGE OF MOTION: Primary | ICD-10-CM

## 2025-05-01 DIAGNOSIS — S62.617A DISPLACED FRACTURE OF PROXIMAL PHALANX OF LEFT LITTLE FINGER, INITIAL ENCOUNTER FOR CLOSED FRACTURE: ICD-10-CM

## 2025-05-01 DIAGNOSIS — Z78.9 DECREASED ACTIVITIES OF DAILY LIVING (ADL): ICD-10-CM

## 2025-05-05 ENCOUNTER — TREATMENT (OUTPATIENT)
Age: 55
End: 2025-05-05
Payer: COMMERCIAL

## 2025-05-05 DIAGNOSIS — M25.641 STIFFNESS OF FINGER JOINT OF RIGHT HAND: ICD-10-CM

## 2025-05-05 DIAGNOSIS — S62.617A DISPLACED FRACTURE OF PROXIMAL PHALANX OF LEFT LITTLE FINGER, INITIAL ENCOUNTER FOR CLOSED FRACTURE: ICD-10-CM

## 2025-05-05 DIAGNOSIS — Z78.9 DECREASED ACTIVITIES OF DAILY LIVING (ADL): ICD-10-CM

## 2025-05-05 DIAGNOSIS — S62.316A CLOSED DISPLACED FRACTURE OF BASE OF FIFTH METACARPAL BONE OF RIGHT HAND, INITIAL ENCOUNTER: ICD-10-CM

## 2025-05-05 DIAGNOSIS — S62.316A DISPLACED FRACTURE OF BASE OF FIFTH METACARPAL BONE, RIGHT HAND, INITIAL ENCOUNTER FOR CLOSED FRACTURE: ICD-10-CM

## 2025-05-05 DIAGNOSIS — M25.60 DECREASED RANGE OF MOTION: Primary | ICD-10-CM

## 2025-05-05 PROCEDURE — 97010 HOT OR COLD PACKS THERAPY: CPT | Performed by: OCCUPATIONAL THERAPIST

## 2025-05-05 PROCEDURE — 97110 THERAPEUTIC EXERCISES: CPT | Performed by: OCCUPATIONAL THERAPIST

## 2025-05-05 NOTE — PROGRESS NOTES
[facility] OT [facility] ORTHOPAEDICS  [address] [place] [phone]  Dept: [phone]      Occupational Therapy Daily Note     Referring MD: Bryson Temple MD    Diagnosis:     ICD-10-CM    1. Decreased range of motion  M25.60       2. Stiffness of finger joint of right hand  M25.641       3. Decreased activities of daily living (ADL)  Z78.9       4. Closed displaced fracture of base of fifth metacarpal bone of right hand, initial encounter  S62.316A       5. Displaced fracture of proximal phalanx of left little finger, initial encounter for closed fracture  S62.617A       6. Displaced fracture of base of fifth metacarpal bone, right hand, initial encounter for closed fracture [S62.316A]  S62.316A           Surgery: Date Right Ultrasound-Guided Carpal Tunnel release, right hand hardware removal, right 5th MCP capsulectomy, right small finger and DIP metacarpal complex tenolysis with removal of adhesions from the collateral ligaments and the extensor tian 4/8/25     Therapy precautions: None      Payor: Payor: [employer] /  /  /  Billing pattern: Workers comp- substantial/midpoint time each CPT  Total Direct Treatment Time: 40 min                       Total In Office Time: 50 min  Modifier needed: No  Episode visit count:  7     Preferred Name:  Messi    SUBJECTIVE     Pt reports he is very tight this morning. States he has been doing all his exercises at home.     OBJECTIVE     Functional Outcome Measures: Quick Dash  51 score=   90.9 % functional deficit  Hand/Side Dominance: right handed  Observation/Posture: Holding UE in protected position, muscle guarding   Palpation: TTP over 5th metacarpal   A/PROM MEASUREMENTS    Screenings:  IF and MF only slightly limited with full flexion due to pain     Digital AROM:  IE RF SF RF  4/17/25 SF  4/17/25   AROM    MCP 54°  45°   63   AROM      °  -50/75°   80   AROM      DIP 40°  30°   40   20/54 Wrist F/E    Strength  Strength (psi): RIGHT  4/21/25

## 2025-05-06 NOTE — PROGRESS NOTES
(psi): RIGHT  4/21/25 LEFT  4/21/25 RIGHT  5/5/25     Position II 49 89 52.6       Treatment:    Hot Pack (05809) x 10 minutes to R hand prior to treatment for moist heat/tissue extensibility in preparation for treatment. Skin checked prior to application and post treatment with no visible skin issues and no pt complaints.     Therapeutic exercise (65153) x 40 min:  PROM (progressive) MP-IP RF and SF  Composite PROM RF (extensive)   Gentle distractions Mpj small finger   With rotational force by therapist to correct malrotation   Ulnar grasp with black resistive pin and sponges  Large dowel press green theraputty with grasp MF-SF  Finger adduction  and pull   3 finger drag into putty (MF-SF)  Finger adduction onto sponge with combined MP flexion        ASSESSMENT     The patient presents today s/p Right Ultrasound-Guided Carpal Tunnel release, right hand hardware removal, right 5th MCP capsulectomy, right small finger and DIP metacarpal complex tenolysis with removal of adhesions from the collateral ligaments and the extensor tian 4/8/25.     Continued stiffness in the MP-IP joints of the small finger. Pain with passive stretching still present. He is able to progress in sessions with active and passive motion.     PLAN OF CARE     Effective Dates: 4/10/2025 TO 7/13/2025 (90 days).    Frequency/Duration:  2-3x/week  for 90 Day(s)  Interventions may include but are not limited to:   97110-Therapeutic procedure/exercise to develop ROM, strength, endurance and flexibility.  (15859- Manual therapy techniques to improve joint and/or soft tissue mobility, ROM, and function as well as helping to decrease pain/spasms and swelling  97530-Therapeutic activities using dynamic activities to improve function  11242-Xzzghkwtvl orthotic management utilizing splint repairs and adjustments addressing fit, comfort and positioning  77876- Self-care/home management training to improve activities of daily living skills and

## 2025-05-08 ENCOUNTER — TREATMENT (OUTPATIENT)
Age: 55
End: 2025-05-08
Payer: COMMERCIAL

## 2025-05-08 DIAGNOSIS — M25.641 STIFFNESS OF FINGER JOINT OF RIGHT HAND: ICD-10-CM

## 2025-05-08 DIAGNOSIS — Z78.9 DECREASED ACTIVITIES OF DAILY LIVING (ADL): ICD-10-CM

## 2025-05-08 DIAGNOSIS — M25.60 DECREASED RANGE OF MOTION: Primary | ICD-10-CM

## 2025-05-08 DIAGNOSIS — S62.316A CLOSED DISPLACED FRACTURE OF BASE OF FIFTH METACARPAL BONE OF RIGHT HAND, INITIAL ENCOUNTER: ICD-10-CM

## 2025-05-08 DIAGNOSIS — S62.316A DISPLACED FRACTURE OF BASE OF FIFTH METACARPAL BONE, RIGHT HAND, INITIAL ENCOUNTER FOR CLOSED FRACTURE: ICD-10-CM

## 2025-05-08 DIAGNOSIS — S62.617A DISPLACED FRACTURE OF PROXIMAL PHALANX OF LEFT LITTLE FINGER, INITIAL ENCOUNTER FOR CLOSED FRACTURE: ICD-10-CM

## 2025-05-08 PROCEDURE — 97110 THERAPEUTIC EXERCISES: CPT | Performed by: OCCUPATIONAL THERAPIST

## 2025-05-08 PROCEDURE — 97010 HOT OR COLD PACKS THERAPY: CPT | Performed by: OCCUPATIONAL THERAPIST

## 2025-05-13 ENCOUNTER — TREATMENT (OUTPATIENT)
Age: 55
End: 2025-05-13
Payer: COMMERCIAL

## 2025-05-13 DIAGNOSIS — S62.617A DISPLACED FRACTURE OF PROXIMAL PHALANX OF LEFT LITTLE FINGER, INITIAL ENCOUNTER FOR CLOSED FRACTURE: ICD-10-CM

## 2025-05-13 DIAGNOSIS — S62.316A DISPLACED FRACTURE OF BASE OF FIFTH METACARPAL BONE, RIGHT HAND, INITIAL ENCOUNTER FOR CLOSED FRACTURE: ICD-10-CM

## 2025-05-13 DIAGNOSIS — M25.60 DECREASED RANGE OF MOTION: Primary | ICD-10-CM

## 2025-05-13 DIAGNOSIS — M25.641 STIFFNESS OF FINGER JOINT OF RIGHT HAND: ICD-10-CM

## 2025-05-13 DIAGNOSIS — S62.316A CLOSED DISPLACED FRACTURE OF BASE OF FIFTH METACARPAL BONE OF RIGHT HAND, INITIAL ENCOUNTER: ICD-10-CM

## 2025-05-13 DIAGNOSIS — Z78.9 DECREASED ACTIVITIES OF DAILY LIVING (ADL): ICD-10-CM

## 2025-05-13 PROCEDURE — 97110 THERAPEUTIC EXERCISES: CPT | Performed by: OCCUPATIONAL THERAPIST

## 2025-05-13 PROCEDURE — 97010 HOT OR COLD PACKS THERAPY: CPT | Performed by: OCCUPATIONAL THERAPIST

## 2025-05-13 NOTE — PROGRESS NOTES
[facility] ORTHOPAED[facility]  [address] [place] [phone]  Dept: [phone]      Occupational Therapy Progress Report     Referring MD: Bryson Temple MD    Diagnosis:     ICD-10-CM    1. Decreased range of motion  M25.60       2. Stiffness of finger joint of right hand  M25.641       3. Decreased activities of daily living (ADL)  Z78.9       4. Closed displaced fracture of base of fifth metacarpal bone of right hand, initial encounter  S62.316A       5. Displaced fracture of proximal phalanx of left little finger, initial encounter for closed fracture  S62.617A       6. Displaced fracture of base of fifth metacarpal bone, right hand, initial encounter for closed fracture [S62.316A]  S62.316A           Surgery: Date Right Ultrasound-Guided Carpal Tunnel release, right hand hardware removal, right 5th MCP capsulectomy, right small finger and DIP metacarpal complex tenolysis with removal of adhesions from the collateral ligaments and the extensor tian 4/8/25     Therapy precautions: None      Payor: Payor: [employer]IDENT [employer] /  /  /  Billing pattern: Workers comp- substantial/midpoint time each CPT  Total Direct Treatment Time: 40 min                       Total In Office Time: 50 min  Modifier needed: No  Episode visit count:  9     Preferred Name:  Messi    SUBJECTIVE     Pt reports he is still very tight in the finger.     OBJECTIVE     Functional Outcome Measures: Quick Dash  51 score=   90.9 % functional deficit  Functional Outcome Measures Progress Note 5/13/25: QuickDASH 48 score= 84% functional deficit   Hand/Side Dominance: right handed  Observation/Posture: muscle guarding with passive ROM at MP and PIP (SF)  Palpation: TTP over 5th metacarpal   A/PROM MEASUREMENTS    Digital AROM:  IE RF SF RF  4/17/25 SF  4/17/25 SF  5/8/25 SF  5/13/25   AROM    MCP 54°  45°   63 60   -5/60   AROM      °  -50/75°   80 84 -35/82   AROM      DIP 40°  30°   40 47 53   20/54 Wrist

## 2025-05-14 ENCOUNTER — TREATMENT (OUTPATIENT)
Age: 55
End: 2025-05-14
Payer: COMMERCIAL

## 2025-05-14 DIAGNOSIS — M25.60 DECREASED RANGE OF MOTION: Primary | ICD-10-CM

## 2025-05-14 DIAGNOSIS — Z78.9 DECREASED ACTIVITIES OF DAILY LIVING (ADL): ICD-10-CM

## 2025-05-14 DIAGNOSIS — M25.641 STIFFNESS OF FINGER JOINT OF RIGHT HAND: ICD-10-CM

## 2025-05-14 PROCEDURE — 97110 THERAPEUTIC EXERCISES: CPT | Performed by: OCCUPATIONAL THERAPIST

## 2025-05-14 PROCEDURE — 97022 WHIRLPOOL THERAPY: CPT | Performed by: OCCUPATIONAL THERAPIST

## 2025-05-14 NOTE — PROGRESS NOTES
[facility] ORTHOPAED[facility]  [address] [place] [address]  Dept: [phone]      Occupational Therapy Daily Note     Referring MD: Bryson Temple MD    Diagnosis:     ICD-10-CM    1. Decreased range of motion  M25.60       2. Stiffness of finger joint of right hand  M25.641       3. Decreased activities of daily living (ADL)  Z78.9           Surgery: Date Right Ultrasound-Guided Carpal Tunnel release, right hand hardware removal, right 5th MCP capsulectomy, right small finger and DIP metacarpal complex tenolysis with removal of adhesions from the collateral ligaments and the extensor tian 4/8/25     Therapy precautions: None      Payor: Payor: [employer] /  /  /  Billing pattern: Workers comp- substantial/midpoint time each CPT  Total Direct Treatment Time: 15 min                       Total In Office Time: 30 min  Modifier needed: No  Episode visit count:  10     Preferred Name:  Messi    SUBJECTIVE     Pt continues to report stiffness but he responds well to heat.     OBJECTIVE     Functional Outcome Measures: Quick Dash  51 score=   90.9 % functional deficit  Functional Outcome Measures Progress Note 5/13/25: QuickDASH 48 score= 84% functional deficit   Hand/Side Dominance: right handed  Observation/Posture: muscle guarding with passive ROM at MP and PIP (SF)  Palpation: TTP over 5th metacarpal   A/PROM MEASUREMENTS    Digital AROM:  IE RF SF RF  4/17/25 SF  4/17/25 SF  5/8/25 SF  5/13/25   AROM    MCP 54°  45°   63 60   -5/60   AROM      °  -50/75°   80 84 -35/82   AROM      DIP 40°  30°   40 47 53   20/54 Wrist F/E    Strength  Strength (psi): RIGHT  4/21/25 LEFT  4/21/25 RIGHT  5/5/25 RIGHT  5/13/25    Position II 49 89 52.6 38.8      Treatment:    Fluidotherapy (50963) Therapist directed exercise in Fluidotherapy to right hand/wrist for preparation for tx and desensitization     Therapeutic exercise (32572) x 15 min:  PROM (progressive) MP-IP RF and SF  Composite PROM RF

## 2025-05-15 DIAGNOSIS — S62.616A CLOSED DISPLACED FRACTURE OF PROXIMAL PHALANX OF RIGHT LITTLE FINGER, INITIAL ENCOUNTER: Primary | ICD-10-CM

## 2025-05-19 NOTE — PROGRESS NOTES
[facility] ORTHOPAEDICS  [address] [place] [id number]  Dept: [phone]      Occupational Therapy Daily Note     Referring MD: Bryson Temple MD    Diagnosis:     ICD-10-CM    1. Decreased range of motion  M25.60       2. Stiffness of finger joint of right hand  M25.641       3. Decreased activities of daily living (ADL)  Z78.9       4. Closed displaced fracture of base of fifth metacarpal bone of right hand, initial encounter  S62.316A       5. Displaced fracture of proximal phalanx of left little finger, initial encounter for closed fracture  S62.617A       6. Displaced fracture of base of fifth metacarpal bone, right hand, initial encounter for closed fracture [S62.316A]  S62.316A           Surgery: Date Right Ultrasound-Guided Carpal Tunnel release, right hand hardware removal, right 5th MCP capsulectomy, right small finger and DIP metacarpal complex tenolysis with removal of adhesions from the collateral ligaments and the extensor tian 4/8/25     Therapy precautions: None      Payor: Payor: [employer]IDENT [employer]D /  /  /  Billing pattern: Workers comp- substantial/midpoint time each CPT  Total Direct Treatment Time: 40 min                       Total In Office Time: 50 min  Modifier needed: No  Episode visit count:  11     Preferred Name:  Messi    SUBJECTIVE     Pt reports that he still has 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     OBJECTIVE     Functional Outcome Measures: Quick Dash  51 score=   90.9 % functional deficit  Functional Outcome Measures Progress Note 5/13/25: QuickDASH 48 score= 84% functional deficit   Hand/Side Dominance: right handed  Observation/Posture: muscle guarding with passive ROM at MP and PIP (SF)  Palpation: TTP over 5th metacarpal   A/PROM MEASUREMENTS    Digital AROM:  IE RF SF RF  4/17/25 SF  4/17/25 SF  5/8/25 SF  5/13/25 SF  5/20/25   AROM    MCP 54°  45°   63 60   -5/60    AROM      °  -50/75°   80 84 -35/82    AROM      DIP 40°  30°   40 47 53

## 2025-05-20 ENCOUNTER — TREATMENT (OUTPATIENT)
Age: 55
End: 2025-05-20
Payer: COMMERCIAL

## 2025-05-20 DIAGNOSIS — M25.641 STIFFNESS OF FINGER JOINT OF RIGHT HAND: ICD-10-CM

## 2025-05-20 DIAGNOSIS — Z78.9 DECREASED ACTIVITIES OF DAILY LIVING (ADL): ICD-10-CM

## 2025-05-20 DIAGNOSIS — S62.617A DISPLACED FRACTURE OF PROXIMAL PHALANX OF LEFT LITTLE FINGER, INITIAL ENCOUNTER FOR CLOSED FRACTURE: ICD-10-CM

## 2025-05-20 DIAGNOSIS — M25.60 DECREASED RANGE OF MOTION: Primary | ICD-10-CM

## 2025-05-20 DIAGNOSIS — S62.316A DISPLACED FRACTURE OF BASE OF FIFTH METACARPAL BONE, RIGHT HAND, INITIAL ENCOUNTER FOR CLOSED FRACTURE: ICD-10-CM

## 2025-05-20 DIAGNOSIS — S62.316A CLOSED DISPLACED FRACTURE OF BASE OF FIFTH METACARPAL BONE OF RIGHT HAND, INITIAL ENCOUNTER: ICD-10-CM

## 2025-05-20 PROCEDURE — 97010 HOT OR COLD PACKS THERAPY: CPT | Performed by: OCCUPATIONAL THERAPIST

## 2025-05-20 PROCEDURE — 97110 THERAPEUTIC EXERCISES: CPT | Performed by: OCCUPATIONAL THERAPIST

## 2025-05-22 ENCOUNTER — TREATMENT (OUTPATIENT)
Age: 55
End: 2025-05-22

## 2025-05-22 DIAGNOSIS — S62.617A DISPLACED FRACTURE OF PROXIMAL PHALANX OF LEFT LITTLE FINGER, INITIAL ENCOUNTER FOR CLOSED FRACTURE: ICD-10-CM

## 2025-05-22 DIAGNOSIS — S62.316A CLOSED DISPLACED FRACTURE OF BASE OF FIFTH METACARPAL BONE OF RIGHT HAND, INITIAL ENCOUNTER: ICD-10-CM

## 2025-05-22 DIAGNOSIS — M25.641 STIFFNESS OF FINGER JOINT OF RIGHT HAND: ICD-10-CM

## 2025-05-22 DIAGNOSIS — S62.316A DISPLACED FRACTURE OF BASE OF FIFTH METACARPAL BONE, RIGHT HAND, INITIAL ENCOUNTER FOR CLOSED FRACTURE: ICD-10-CM

## 2025-05-22 DIAGNOSIS — M25.60 DECREASED RANGE OF MOTION: Primary | ICD-10-CM

## 2025-05-22 DIAGNOSIS — Z78.9 DECREASED ACTIVITIES OF DAILY LIVING (ADL): ICD-10-CM

## 2025-05-22 NOTE — PROGRESS NOTES
[facility] OT [facility] ORTHOPAEDICS  [address] [place] [address]  Dept: [phone]      Occupational Therapy Daily Note     Referring MD: Bryson Temple MD    Diagnosis:     ICD-10-CM    1. Decreased range of motion  M25.60       2. Stiffness of finger joint of right hand  M25.641       3. Decreased activities of daily living (ADL)  Z78.9       4. Closed displaced fracture of base of fifth metacarpal bone of right hand, initial encounter  S62.316A       5. Displaced fracture of proximal phalanx of left little finger, initial encounter for closed fracture  S62.617A       6. Displaced fracture of base of fifth metacarpal bone, right hand, initial encounter for closed fracture [S62.316A]  S62.316A           Surgery: Date Right Ultrasound-Guided Carpal Tunnel release, right hand hardware removal, right 5th MCP capsulectomy, right small finger and DIP metacarpal complex tenolysis with removal of adhesions from the collateral ligaments and the extensor tian 4/8/25     Therapy precautions: None      Payor: Payor: [employer]IDENT [employer]D /  /  /  Billing pattern: Workers comp- substantial/midpoint time each CPT  Total Direct Treatment Time: 40 min                       Total In Office Time: 50 min  Modifier needed: No  Episode visit count:  12     Preferred Name:  Messi    SUBJECTIVE     Tightness reported today.     OBJECTIVE     Functional Outcome Measures: Quick Dash  51 score=   90.9 % functional deficit  Functional Outcome Measures Progress Note 5/13/25: QuickDASH 48 score= 84% functional deficit   Hand/Side Dominance: right handed  Observation/Posture: muscle guarding with passive ROM at MP and PIP (SF)  Palpation: TTP over 5th metacarpal   A/PROM MEASUREMENTS    Digital AROM:  IE RF SF RF  4/17/25 SF  4/17/25 SF  5/8/25 SF  5/13/25 SF  5/20/25   AROM    MCP 54°  45°   63 60   -5/60    AROM      °  -50/75°   80 84 -35/82    AROM      DIP 40°  30°   40 47 53    20/54 Wrist F/E    Strength  Strength

## 2025-05-28 ENCOUNTER — TREATMENT (OUTPATIENT)
Age: 55
End: 2025-05-28
Payer: COMMERCIAL

## 2025-05-28 DIAGNOSIS — M25.641 STIFFNESS OF FINGER JOINT OF RIGHT HAND: ICD-10-CM

## 2025-05-28 DIAGNOSIS — S62.617A DISPLACED FRACTURE OF PROXIMAL PHALANX OF LEFT LITTLE FINGER, INITIAL ENCOUNTER FOR CLOSED FRACTURE: ICD-10-CM

## 2025-05-28 DIAGNOSIS — M25.60 DECREASED RANGE OF MOTION: Primary | ICD-10-CM

## 2025-05-28 DIAGNOSIS — Z78.9 DECREASED ACTIVITIES OF DAILY LIVING (ADL): ICD-10-CM

## 2025-05-28 DIAGNOSIS — S62.316A CLOSED DISPLACED FRACTURE OF BASE OF FIFTH METACARPAL BONE OF RIGHT HAND, INITIAL ENCOUNTER: ICD-10-CM

## 2025-05-28 DIAGNOSIS — S62.316A DISPLACED FRACTURE OF BASE OF FIFTH METACARPAL BONE, RIGHT HAND, INITIAL ENCOUNTER FOR CLOSED FRACTURE: ICD-10-CM

## 2025-05-28 PROCEDURE — 97110 THERAPEUTIC EXERCISES: CPT | Performed by: OCCUPATIONAL THERAPIST

## 2025-05-28 PROCEDURE — 97010 HOT OR COLD PACKS THERAPY: CPT | Performed by: OCCUPATIONAL THERAPIST

## 2025-05-28 NOTE — PROGRESS NOTES
[facility] ORTHOPAEDICS  [address] [place] [address]  Dept: [phone]      Occupational Therapy Daily Note     Referring MD: Bryson Temple MD    Diagnosis:     ICD-10-CM    1. Decreased range of motion  M25.60       2. Stiffness of finger joint of right hand  M25.641       3. Decreased activities of daily living (ADL)  Z78.9       4. Displaced fracture of proximal phalanx of left little finger, initial encounter for closed fracture  S62.617A       5. Closed displaced fracture of base of fifth metacarpal bone of right hand, initial encounter  S62.316A       6. Displaced fracture of base of fifth metacarpal bone, right hand, initial encounter for closed fracture [S62.316A]  S62.316A           Surgery: Date Right Ultrasound-Guided Carpal Tunnel release, right hand hardware removal, right 5th MCP capsulectomy, right small finger and DIP metacarpal complex tenolysis with removal of adhesions from the collateral ligaments and the extensor tian 4/8/25     Therapy precautions: None      Payor: Payor: [employer]IDENT [employer]D /  /  /  Billing pattern: Workers comp- substantial/midpoint time each CPT  Total Direct Treatment Time: 40 min                       Total In Office Time: 50 min  Modifier needed: No  Episode visit count:  13     Preferred Name:  Messi    SUBJECTIVE     Tightness reported today.     OBJECTIVE     Functional Outcome Measures: Quick Dash  51 score=   90.9 % functional deficit  Functional Outcome Measures Progress Note 5/13/25: QuickDASH 48 score= 84% functional deficit   Hand/Side Dominance: right handed  Observation/Posture: muscle guarding with passive ROM at MP and PIP (SF)  Palpation: TTP over 5th metacarpal   A/PROM MEASUREMENTS    Digital AROM:  IE RF SF RF  4/17/25 SF  4/17/25 SF  5/8/25 SF  5/13/25 SF  5/20/25   AROM    MCP 54°  45°   63 60   -5/60    AROM      °  -50/75°   80 84 -35/82    AROM      DIP 40°  30°   40 47 53    20/54 Wrist F/E    Strength  Strength

## 2025-05-29 ENCOUNTER — TREATMENT (OUTPATIENT)
Age: 55
End: 2025-05-29
Payer: COMMERCIAL

## 2025-05-29 DIAGNOSIS — M25.641 STIFFNESS OF FINGER JOINT OF RIGHT HAND: ICD-10-CM

## 2025-05-29 DIAGNOSIS — S62.316A DISPLACED FRACTURE OF BASE OF FIFTH METACARPAL BONE, RIGHT HAND, INITIAL ENCOUNTER FOR CLOSED FRACTURE: ICD-10-CM

## 2025-05-29 DIAGNOSIS — S62.617A DISPLACED FRACTURE OF PROXIMAL PHALANX OF LEFT LITTLE FINGER, INITIAL ENCOUNTER FOR CLOSED FRACTURE: ICD-10-CM

## 2025-05-29 DIAGNOSIS — Z78.9 DECREASED ACTIVITIES OF DAILY LIVING (ADL): ICD-10-CM

## 2025-05-29 DIAGNOSIS — S62.316A CLOSED DISPLACED FRACTURE OF BASE OF FIFTH METACARPAL BONE OF RIGHT HAND, INITIAL ENCOUNTER: ICD-10-CM

## 2025-05-29 DIAGNOSIS — M25.60 DECREASED RANGE OF MOTION: Primary | ICD-10-CM

## 2025-05-29 PROCEDURE — 97022 WHIRLPOOL THERAPY: CPT | Performed by: OCCUPATIONAL THERAPIST

## 2025-05-29 PROCEDURE — 97110 THERAPEUTIC EXERCISES: CPT | Performed by: OCCUPATIONAL THERAPIST

## 2025-05-29 NOTE — PROGRESS NOTES
[facility] OT [facility] ORTHOPAEDICS  [address] [place] [id number]  Dept: [phone]      Occupational Therapy Daily Note     Referring MD: Bryson Temple MD    Diagnosis:     ICD-10-CM    1. Decreased range of motion  M25.60       2. Stiffness of finger joint of right hand  M25.641       3. Decreased activities of daily living (ADL)  Z78.9       4. Displaced fracture of proximal phalanx of left little finger, initial encounter for closed fracture  S62.617A       5. Displaced fracture of base of fifth metacarpal bone, right hand, initial encounter for closed fracture [S62.316A]  S62.316A       6. Closed displaced fracture of base of fifth metacarpal bone of right hand, initial encounter  S62.316A           Surgery: Date Right Ultrasound-Guided Carpal Tunnel release, right hand hardware removal, right 5th MCP capsulectomy, right small finger and DIP metacarpal complex tenolysis with removal of adhesions from the collateral ligaments and the extensor tian 4/8/25     Therapy precautions: None      Payor: Payor: ACCIDENT [employer]D /  /  /  Billing pattern: Workers comp- substantial/midpoint time each CPT  Total Direct Treatment Time: 40 min                       Total In Office Time: 50 min  Modifier needed: No  Episode visit count:  14     Preferred Name:  Messi    SUBJECTIVE     Continued tightness reported. Pt states that opening jars and containers is still very difficult for him to do.     OBJECTIVE     Functional Outcome Measures: Quick Dash  51 score=   90.9 % functional deficit  Functional Outcome Measures Progress Note 5/13/25: QuickDASH 48 score= 84% functional deficit   Hand/Side Dominance: right handed  Observation/Posture: muscle guarding with passive ROM at MP and PIP (SF)  Palpation: TTP over 5th metacarpal   A/PROM MEASUREMENTS    Digital AROM:  IE RF SF RF  4/17/25 SF  4/17/25 SF  5/8/25 SF  5/13/25 SF  5/20/25   AROM    MCP 54°  45°   63 60   -5/60    AROM      °  -50/75°   80 84

## 2025-06-09 ENCOUNTER — TREATMENT (OUTPATIENT)
Age: 55
End: 2025-06-09
Payer: COMMERCIAL

## 2025-06-09 DIAGNOSIS — S62.316A CLOSED DISPLACED FRACTURE OF BASE OF FIFTH METACARPAL BONE OF RIGHT HAND, INITIAL ENCOUNTER: ICD-10-CM

## 2025-06-09 DIAGNOSIS — S62.617A DISPLACED FRACTURE OF PROXIMAL PHALANX OF LEFT LITTLE FINGER, INITIAL ENCOUNTER FOR CLOSED FRACTURE: ICD-10-CM

## 2025-06-09 DIAGNOSIS — S62.316A DISPLACED FRACTURE OF BASE OF FIFTH METACARPAL BONE, RIGHT HAND, INITIAL ENCOUNTER FOR CLOSED FRACTURE: ICD-10-CM

## 2025-06-09 DIAGNOSIS — M25.641 STIFFNESS OF FINGER JOINT OF RIGHT HAND: ICD-10-CM

## 2025-06-09 DIAGNOSIS — M25.60 DECREASED RANGE OF MOTION: Primary | ICD-10-CM

## 2025-06-09 DIAGNOSIS — Z78.9 DECREASED ACTIVITIES OF DAILY LIVING (ADL): ICD-10-CM

## 2025-06-09 PROCEDURE — 97022 WHIRLPOOL THERAPY: CPT | Performed by: OCCUPATIONAL THERAPIST

## 2025-06-09 PROCEDURE — 97110 THERAPEUTIC EXERCISES: CPT | Performed by: OCCUPATIONAL THERAPIST

## 2025-06-09 NOTE — PROGRESS NOTES
independence  44383- Neuromuscular reeducation addressing impaired coordination, kinesthetic sense, posture and proprioception  Modalities prn to address pain, spasms, tissue extensibility and swelling:(21750) Electrical stimulation - attended  (50276) Ultrasound/phonophoresis  (65688) Hot/cold pack  (84049) Fluidotherapy  (53881) Paraffin    The referring medical provider has reviewed and approved this evaluation and plan of care as noted by the electronic signature attached to note.    GOALS     Short term goals: 5/12/2025  (4 weeks)  Patient will be I with HEP and precautions.  MET  Increase AROM of affected SF MP flexion to at least 70 ° to improve ability to grasp.  Not fully met, pt has about 60 degrees of active flexion   Increase AROM of affected SF MP extension to at least 0 ° to improve ability to open hand to obtain objects.  Almost met, pt has -5 of MP extension   Increase AROM of affected SF PIP flexion to at least 85 ° to improve ability to grasp.  Not fully met as of 5/13/25  Increase AROM of affected SF DIP flexion to at least 45 ° to improve ability to grasp.  MET  Decrease edema in affected digit by 0.5 cm to improve motion.  MET  Improve Quick DASH functional assessment score from less than 50% deficit.  Not met     Long term goals: 7/7/2025  (12 weeks)   Pt will be able to use the affected UE for all ADL's without difficulty.  Pt will have adequate strength to be able to hold and open containers without difficulty.  Pt will be able to make a full composite fist with the involved hand to enable to grasp and hold objects.  Minimal edema in involved digit.   Improve Quick DASH functional assessment score from less than 20% deficit.      [facility] Portal     OT Protocols

## 2025-06-11 ENCOUNTER — TREATMENT (OUTPATIENT)
Age: 55
End: 2025-06-11
Payer: COMMERCIAL

## 2025-06-11 ENCOUNTER — OFFICE VISIT (OUTPATIENT)
Age: 55
End: 2025-06-11

## 2025-06-11 DIAGNOSIS — S62.316A DISPLACED FRACTURE OF BASE OF FIFTH METACARPAL BONE, RIGHT HAND, INITIAL ENCOUNTER FOR CLOSED FRACTURE: ICD-10-CM

## 2025-06-11 DIAGNOSIS — S62.316A DISPLACED FRACTURE OF BASE OF FIFTH METACARPAL BONE, RIGHT HAND, INITIAL ENCOUNTER FOR CLOSED FRACTURE: Primary | ICD-10-CM

## 2025-06-11 DIAGNOSIS — S62.616A CLOSED DISPLACED FRACTURE OF PROXIMAL PHALANX OF RIGHT LITTLE FINGER, INITIAL ENCOUNTER: ICD-10-CM

## 2025-06-11 DIAGNOSIS — G56.01 RIGHT CARPAL TUNNEL SYNDROME: ICD-10-CM

## 2025-06-11 DIAGNOSIS — S62.617A DISPLACED FRACTURE OF PROXIMAL PHALANX OF LEFT LITTLE FINGER, INITIAL ENCOUNTER FOR CLOSED FRACTURE: ICD-10-CM

## 2025-06-11 DIAGNOSIS — M25.641 STIFFNESS OF FINGER JOINT OF RIGHT HAND: ICD-10-CM

## 2025-06-11 DIAGNOSIS — M25.60 DECREASED RANGE OF MOTION: Primary | ICD-10-CM

## 2025-06-11 DIAGNOSIS — S62.316A CLOSED DISPLACED FRACTURE OF BASE OF FIFTH METACARPAL BONE OF RIGHT HAND, INITIAL ENCOUNTER: ICD-10-CM

## 2025-06-11 DIAGNOSIS — Z78.9 DECREASED ACTIVITIES OF DAILY LIVING (ADL): ICD-10-CM

## 2025-06-11 PROCEDURE — 99024 POSTOP FOLLOW-UP VISIT: CPT | Performed by: ORTHOPAEDIC SURGERY

## 2025-06-11 PROCEDURE — 97110 THERAPEUTIC EXERCISES: CPT | Performed by: OCCUPATIONAL THERAPIST

## 2025-06-11 PROCEDURE — 97022 WHIRLPOOL THERAPY: CPT | Performed by: OCCUPATIONAL THERAPIST

## 2025-06-11 NOTE — PROGRESS NOTES
Orthopedic Hand Surgery Note    Messi Dumont  1970  male    History of Present Illness  The patient is 6 weeks status post right hand hardware removal, capsulectomy, and manipulation.    He reports significant improvement in motion. However, he experiences some paresthesias on the dorsal ulnar aspect of the small finger.    Physical Exam  On physical examination of the right hand, he has well healed surgical scars. He can make a full composite fist with all fingers except the small finger where he has a 5 mm tip to palm distance. Passively, I was able to get much closer to the palm.    1. Displaced fracture of base of fifth metacarpal bone, right hand, initial encounter for closed fracture    2. Closed displaced fracture of proximal phalanx of right little finger, initial encounter    3. Right carpal tunnel syndrome      Assessment & Plan  1. Postoperative status following right hand hardware removal, capsulectomy, and manipulation.  He reports significant improvement in motion. On physical examination, he has well-healed surgical scars and can make a full composite fist with all fingers except the small finger, which has a 5 mm tip to palm distance. Passively, the small finger can be brought much closer to the palm. He has some paresthesias on the dorsal ulnar aspect of the small finger.    PROCEDURE  The patient underwent right hand hardware removal, capsulectomy, and manipulation 6 weeks ago.    Bryson Rader Jr, MD, PhD  Orthopaedic Surgery  06/11/25  3:55 PM

## 2025-06-16 NOTE — PROGRESS NOTES
[facility] ORTHOPAED[facility]  [address] [place] [phone]  Dept: [phone]      Occupational Therapy Daily Note     Referring MD: Bryson Temple MD    Diagnosis:     ICD-10-CM    1. Decreased range of motion  M25.60       2. Displaced fracture of base of fifth metacarpal bone, right hand, initial encounter for closed fracture [S62.316A]  S62.316A       3. Stiffness of finger joint of right hand  M25.641       4. Decreased activities of daily living (ADL)  Z78.9       5. Displaced fracture of proximal phalanx of left little finger, initial encounter for closed fracture  S62.615B       6. Closed displaced fracture of base of fifth metacarpal bone of right hand, initial encounter  S62.316A         Surgery: Date Right Ultrasound-Guided Carpal Tunnel release, right hand hardware removal, right 5th MCP capsulectomy, right small finger and DIP metacarpal complex tenolysis with removal of adhesions from the collateral ligaments and the extensor tian 4/8/25     Therapy precautions: None      Payor: Payor: ACCIDENT [employer]D /  /  /  Billing pattern: Workers comp- substantial/midpoint time each CPT  Total Direct Treatment Time: 40 min                       Total In Office Time: 50 min  Modifier needed: No  Episode visit count:  17     Preferred Name:  Messi    SUBJECTIVE     Pt reports that he has been using his hand more. He states \"I wish my finger would straighten out.\" He did not bring in his MP flexion tool today.     OBJECTIVE     Functional Outcome Measures: Quick Dash  51 score=   90.9 % functional deficit  Functional Outcome Measures Progress Note 5/13/25: QuickDASH 48 score= 84% functional deficit   Hand/Side Dominance: right handed  Observation/Posture: muscle guarding with passive ROM at MP and PIP (SF)  Palpation: TTP over 5th metacarpal   A/PROM MEASUREMENTS    Digital AROM:  IE RF SF RF  4/17/25 SF  4/17/25 SF  5/8/25 SF  5/13/25 SF  5/20/25 SF  6/11/25    AROM    MCP 54°  45°   63 60

## 2025-06-17 ENCOUNTER — TREATMENT (OUTPATIENT)
Age: 55
End: 2025-06-17
Payer: COMMERCIAL

## 2025-06-17 DIAGNOSIS — S62.316A DISPLACED FRACTURE OF BASE OF FIFTH METACARPAL BONE, RIGHT HAND, INITIAL ENCOUNTER FOR CLOSED FRACTURE: ICD-10-CM

## 2025-06-17 DIAGNOSIS — S62.617A DISPLACED FRACTURE OF PROXIMAL PHALANX OF LEFT LITTLE FINGER, INITIAL ENCOUNTER FOR CLOSED FRACTURE: ICD-10-CM

## 2025-06-17 DIAGNOSIS — M25.641 STIFFNESS OF FINGER JOINT OF RIGHT HAND: ICD-10-CM

## 2025-06-17 DIAGNOSIS — M25.60 DECREASED RANGE OF MOTION: Primary | ICD-10-CM

## 2025-06-17 DIAGNOSIS — Z78.9 DECREASED ACTIVITIES OF DAILY LIVING (ADL): ICD-10-CM

## 2025-06-17 DIAGNOSIS — S62.316A CLOSED DISPLACED FRACTURE OF BASE OF FIFTH METACARPAL BONE OF RIGHT HAND, INITIAL ENCOUNTER: ICD-10-CM

## 2025-06-17 PROCEDURE — 97110 THERAPEUTIC EXERCISES: CPT | Performed by: OCCUPATIONAL THERAPIST

## 2025-06-17 PROCEDURE — 97022 WHIRLPOOL THERAPY: CPT | Performed by: OCCUPATIONAL THERAPIST

## 2025-06-19 ENCOUNTER — TREATMENT (OUTPATIENT)
Age: 55
End: 2025-06-19
Payer: COMMERCIAL

## 2025-06-19 DIAGNOSIS — S62.316A CLOSED DISPLACED FRACTURE OF BASE OF FIFTH METACARPAL BONE OF RIGHT HAND, INITIAL ENCOUNTER: ICD-10-CM

## 2025-06-19 DIAGNOSIS — S62.617A DISPLACED FRACTURE OF PROXIMAL PHALANX OF LEFT LITTLE FINGER, INITIAL ENCOUNTER FOR CLOSED FRACTURE: ICD-10-CM

## 2025-06-19 DIAGNOSIS — Z78.9 DECREASED ACTIVITIES OF DAILY LIVING (ADL): ICD-10-CM

## 2025-06-19 DIAGNOSIS — S62.316A DISPLACED FRACTURE OF BASE OF FIFTH METACARPAL BONE, RIGHT HAND, INITIAL ENCOUNTER FOR CLOSED FRACTURE: ICD-10-CM

## 2025-06-19 DIAGNOSIS — M25.60 DECREASED RANGE OF MOTION: Primary | ICD-10-CM

## 2025-06-19 DIAGNOSIS — M25.641 STIFFNESS OF FINGER JOINT OF RIGHT HAND: ICD-10-CM

## 2025-06-19 PROCEDURE — 97110 THERAPEUTIC EXERCISES: CPT | Performed by: OCCUPATIONAL THERAPIST

## 2025-06-19 PROCEDURE — 97022 WHIRLPOOL THERAPY: CPT | Performed by: OCCUPATIONAL THERAPIST

## 2025-06-19 NOTE — PROGRESS NOTES
[facility] OT [facility] ORTHOPAEDICS  [address] [place] [address]  Dept: [phone]      Occupational Therapy Daily Note     Referring MD: Bryson Temple MD    Diagnosis:     ICD-10-CM    1. Decreased range of motion  M25.60       2. Displaced fracture of base of fifth metacarpal bone, right hand, initial encounter for closed fracture [S62.316A]  S62.316A       3. Stiffness of finger joint of right hand  M25.641       4. Decreased activities of daily living (ADL)  Z78.9       5. Displaced fracture of proximal phalanx of left little finger, initial encounter for closed fracture  S62.617A       6. Closed displaced fracture of base of fifth metacarpal bone of right hand, initial encounter  S62.316A         Surgery: Date Right Ultrasound-Guided Carpal Tunnel release, right hand hardware removal, right 5th MCP capsulectomy, right small finger and DIP metacarpal complex tenolysis with removal of adhesions from the collateral ligaments and the extensor tian 4/8/25     Therapy precautions: None    Payor: Payor: ACCIDENT FUND /  /  /  Billing pattern: Workers comp- substantial/midpoint time each CPT  Total Direct Treatment Time: 40 min                       Total In Office Time: 50 min  Modifier needed: No  Episode visit count:  18     Preferred Name:  Messi    SUBJECTIVE     Pt reports he still cannot straighten his finger. He is somewhat disappointed about this. He states he continues to stretch and do exercises.     OBJECTIVE     Functional Outcome Measures: Quick Dash  51 score=   90.9 % functional deficit  Functional Outcome Measures Progress Note 5/13/25: QuickDASH 48 score= 84% functional deficit   Hand/Side Dominance: right handed  Observation/Posture: muscle guarding with passive ROM at MP and PIP (SF)  Palpation: TTP over 5th metacarpal   A/PROM MEASUREMENTS    Digital AROM:  IE RF SF RF  4/17/25 SF  4/17/25 SF  5/8/25 SF  5/13/25 SF  5/20/25 SF  6/11/25 SF  6/17/25   AROM    MCP 54°  45°   63

## 2025-06-23 ENCOUNTER — TREATMENT (OUTPATIENT)
Age: 55
End: 2025-06-23
Payer: COMMERCIAL

## 2025-06-23 DIAGNOSIS — S62.316A CLOSED DISPLACED FRACTURE OF BASE OF FIFTH METACARPAL BONE OF RIGHT HAND, INITIAL ENCOUNTER: ICD-10-CM

## 2025-06-23 DIAGNOSIS — M25.60 DECREASED RANGE OF MOTION: Primary | ICD-10-CM

## 2025-06-23 DIAGNOSIS — Z78.9 DECREASED ACTIVITIES OF DAILY LIVING (ADL): ICD-10-CM

## 2025-06-23 DIAGNOSIS — S62.316A DISPLACED FRACTURE OF BASE OF FIFTH METACARPAL BONE, RIGHT HAND, INITIAL ENCOUNTER FOR CLOSED FRACTURE: ICD-10-CM

## 2025-06-23 DIAGNOSIS — S62.617A DISPLACED FRACTURE OF PROXIMAL PHALANX OF LEFT LITTLE FINGER, INITIAL ENCOUNTER FOR CLOSED FRACTURE: ICD-10-CM

## 2025-06-23 DIAGNOSIS — M25.641 STIFFNESS OF FINGER JOINT OF RIGHT HAND: ICD-10-CM

## 2025-06-23 PROCEDURE — 97022 WHIRLPOOL THERAPY: CPT | Performed by: OCCUPATIONAL THERAPIST

## 2025-06-23 PROCEDURE — 97110 THERAPEUTIC EXERCISES: CPT | Performed by: OCCUPATIONAL THERAPIST

## 2025-06-23 NOTE — PROGRESS NOTES
[facility] OT [facility] ORTHOPAEDICS  [address] [place] [address]  Dept: [phone]      Occupational Therapy Daily Note     Referring MD: Bryson Temple MD    Diagnosis:     ICD-10-CM    1. Decreased range of motion  M25.60       2. Displaced fracture of base of fifth metacarpal bone, right hand, initial encounter for closed fracture [S62.316A]  S62.316A       3. Stiffness of finger joint of right hand  M25.641       4. Decreased activities of daily living (ADL)  Z78.9       5. Displaced fracture of proximal phalanx of left little finger, initial encounter for closed fracture  S62.618Y       6. Closed displaced fracture of base of fifth metacarpal bone of right hand, initial encounter  S62.316A           Surgery: Date Right Ultrasound-Guided Carpal Tunnel release, right hand hardware removal, right 5th MCP capsulectomy, right small finger and DIP metacarpal complex tenolysis with removal of adhesions from the collateral ligaments and the extensor tian 4/8/25     Therapy precautions: None    Payor: Payor: ACCIDENT FUND /  /  /  Billing pattern: Workers comp- substantial/midpoint time each CPT  Total Direct Treatment Time: 40 min                       Total In Office Time: 50 min  Modifier needed: No  Episode visit count:  19     Preferred Name:  Messi    SUBJECTIVE     Pt reports tightness in his hand today.     OBJECTIVE     Functional Outcome Measures: Quick Dash  51 score=   90.9 % functional deficit  Functional Outcome Measures Progress Note 5/13/25: QuickDASH 48 score= 84% functional deficit   Hand/Side Dominance: right handed  Observation/Posture: muscle guarding with passive ROM at MP and PIP (SF)  Palpation: TTP over 5th metacarpal   A/PROM MEASUREMENTS    Digital AROM:  IE RF SF RF  4/17/25 SF  4/17/25 SF  5/8/25 SF  5/13/25 SF  5/20/25 SF  6/11/25 SF  6/17/25   AROM    MCP 54°  45°   63 60   -5/60 67 -4/70 75   AROM      °  -50/75°   80 84 -35/82 80 -30/81 85   AROM      DIP 40°

## 2025-06-30 ENCOUNTER — TREATMENT (OUTPATIENT)
Age: 55
End: 2025-06-30
Payer: COMMERCIAL

## 2025-06-30 DIAGNOSIS — Z78.9 DECREASED ACTIVITIES OF DAILY LIVING (ADL): ICD-10-CM

## 2025-06-30 DIAGNOSIS — M25.60 DECREASED RANGE OF MOTION: Primary | ICD-10-CM

## 2025-06-30 DIAGNOSIS — M25.641 STIFFNESS OF FINGER JOINT OF RIGHT HAND: ICD-10-CM

## 2025-06-30 PROCEDURE — 97022 WHIRLPOOL THERAPY: CPT | Performed by: OCCUPATIONAL THERAPIST

## 2025-06-30 PROCEDURE — 97110 THERAPEUTIC EXERCISES: CPT | Performed by: OCCUPATIONAL THERAPIST

## 2025-06-30 NOTE — PROGRESS NOTES
[facility]  [address] [place] [id number]  Dept: [phone]      Occupational Therapy Progress Report     Referring MD: Bryson Temple MD    Diagnosis:     ICD-10-CM    1. Decreased range of motion  M25.60       2. Stiffness of finger joint of right hand  M25.641       3. Decreased activities of daily living (ADL)  Z78.9           Surgery: Date Right Ultrasound-Guided Carpal Tunnel release, right hand hardware removal, right 5th MCP capsulectomy, right small finger and DIP metacarpal complex tenolysis with removal of adhesions from the collateral ligaments and the extensor tian 4/8/25     Therapy precautions: None    Payor: Payor: [employer] /  /  /  Billing pattern: Workers comp- substantial/midpoint time each CPT  Total Direct Treatment Time: 40 min                       Total In Office Time: 50 min  Modifier needed: No  Episode visit count:  20     Preferred Name:  Messi    SUBJECTIVE     Pt reports continued tightness in the hand. He states he is having continued difficulty with activities like opening jars and containers.     OBJECTIVE     Functional Outcome Measures: Quick Dash  51 score=   90.9 % functional deficit  Functional Outcome Measures Progress Note 5/13/25: QuickDASH 48 score= 84% functional deficit   Functional Outcome Measure Progress Note 6/3025: QuickDASH  47 score= 81.8% functional deficit   Hand/Side Dominance: right handed  Observation/Posture: muscle guarding with passive ROM at MP and PIP (SF)  Palpation: TTP over 5th metacarpal   A/PROM MEASUREMENTS    Digital AROM:  IE RF SF RF  4/17/25 SF  4/17/25 SF  5/8/25 SF  5/13/25 SF  5/20/25 SF  6/11/25 SF  6/17/25 SF  6/30/25   AROM    MCP 54°  45°   63 60   -5/60 67 -4/70 75 80   AROM      °  -50/75°   80 84 -35/82 80 -30/81 85 88   AROM      DIP 40°  30°   40 47 53 52 52 58 55   20/54 Wrist F/E    Strength  Strength (psi): RIGHT  4/21/25 LEFT  4/21/25 RIGHT  5/5/25 RIGHT  5/13/25 RIGHT  6/9/25

## 2025-07-02 DIAGNOSIS — T85.848A PAIN FROM IMPLANTED HARDWARE, INITIAL ENCOUNTER: ICD-10-CM

## 2025-07-02 DIAGNOSIS — S62.316A DISPLACED FRACTURE OF BASE OF FIFTH METACARPAL BONE, RIGHT HAND, INITIAL ENCOUNTER FOR CLOSED FRACTURE: Primary | ICD-10-CM

## 2025-07-02 DIAGNOSIS — G56.01 RIGHT CARPAL TUNNEL SYNDROME: ICD-10-CM

## 2025-07-02 DIAGNOSIS — S62.616A CLOSED DISPLACED FRACTURE OF PROXIMAL PHALANX OF RIGHT LITTLE FINGER, INITIAL ENCOUNTER: ICD-10-CM

## 2025-07-16 ENCOUNTER — TREATMENT (OUTPATIENT)
Age: 55
End: 2025-07-16
Payer: COMMERCIAL

## 2025-07-16 DIAGNOSIS — M25.641 STIFFNESS OF FINGER JOINT OF RIGHT HAND: ICD-10-CM

## 2025-07-16 DIAGNOSIS — Z78.9 DECREASED ACTIVITIES OF DAILY LIVING (ADL): ICD-10-CM

## 2025-07-16 DIAGNOSIS — M25.60 DECREASED RANGE OF MOTION: Primary | ICD-10-CM

## 2025-07-16 PROCEDURE — 97110 THERAPEUTIC EXERCISES: CPT | Performed by: OCCUPATIONAL THERAPIST

## 2025-07-16 PROCEDURE — 97022 WHIRLPOOL THERAPY: CPT | Performed by: OCCUPATIONAL THERAPIST

## 2025-07-16 NOTE — PROGRESS NOTES
[facility]  [address] [place] [id number]  Dept: [phone]      Occupational Therapy Daily Note     Referring MD: Bryson Temple MD    Diagnosis:     ICD-10-CM    1. Decreased range of motion  M25.60       2. Stiffness of finger joint of right hand  M25.641       3. Decreased activities of daily living (ADL)  Z78.9       Surgery: Date Right Ultrasound-Guided Carpal Tunnel release, right hand hardware removal, right 5th MCP capsulectomy, right small finger and DIP metacarpal complex tenolysis with removal of adhesions from the collateral ligaments and the extensor tian 4/8/25     Therapy precautions: None    Payor: Payor: [employer] /  /  /  Billing pattern: Workers comp- substantial/midpoint time each CPT  Total Direct Treatment Time: 40 min                       Total In Office Time: 50 min  Modifier needed: No  Episode visit count:  4     Preferred Name:  Messi    SUBJECTIVE     Reports he is pain free today in the hand.  Reporting tingling sensations in the ulnar hand, constant.  OBJECTIVE     Functional Outcome Measures: Quick Dash  51 score=   90.9 % functional deficit  Functional Outcome Measures Progress Note 5/13/25: QuickDASH 48 score= 84% functional deficit   Functional Outcome Measure Progress Note 6/3025: QuickDASH  47 score= 81.8% functional deficit     Digital AROM:  IE RF SF SF  6/30/25   AROM    MCP 54°  45°  80   AROM      °  -50/75°  88   AROM      DIP 40°  30°  55   60/50 wrist extension/flexion 7/16/25    Strength (psi): RIGHT  4/21/25 LEFT  4/21/25 RIGHT  5/5/25 RIGHT  5/13/25 RIGHT  6/9/25 RIGHT  6/30/25    Position II 49 89 52.6 38.8 51 50      Treatment:    Fluidotherapy (91322) Therapist directed exercise in Fluidotherapy to right hand/wrist for preparation for tx and desensitization     Therapeutic exercise (68681) x 30 min:  PROM (extensive) MP-IP RF and SF  Composite PROM RF (extensive)   Distractions with oscillations,

## 2025-07-21 ENCOUNTER — TREATMENT (OUTPATIENT)
Age: 55
End: 2025-07-21
Payer: COMMERCIAL

## 2025-07-21 DIAGNOSIS — Z78.9 DECREASED ACTIVITIES OF DAILY LIVING (ADL): ICD-10-CM

## 2025-07-21 DIAGNOSIS — M25.60 DECREASED RANGE OF MOTION: Primary | ICD-10-CM

## 2025-07-21 DIAGNOSIS — M25.641 STIFFNESS OF FINGER JOINT OF RIGHT HAND: ICD-10-CM

## 2025-07-21 PROCEDURE — 97110 THERAPEUTIC EXERCISES: CPT | Performed by: OCCUPATIONAL THERAPIST

## 2025-07-21 PROCEDURE — 97022 WHIRLPOOL THERAPY: CPT | Performed by: OCCUPATIONAL THERAPIST

## 2025-07-21 NOTE — PROGRESS NOTES
[facility]  [address] [place] [id number]  Dept: [phone]      Occupational Therapy Daily Note     Referring MD: Bryson Temple MD    Diagnosis:     ICD-10-CM    1. Decreased range of motion  M25.60       2. Stiffness of finger joint of right hand  M25.641       3. Decreased activities of daily living (ADL)  Z78.9       Surgery: Date Right Ultrasound-Guided Carpal Tunnel release, right hand hardware removal, right 5th MCP capsulectomy, right small finger and DIP metacarpal complex tenolysis with removal of adhesions from the collateral ligaments and the extensor tian 4/8/25     Therapy precautions: None    Payor: Payor: [employer] /  /  /  Billing pattern: Workers comp- substantial/midpoint time each CPT  Total Direct Treatment Time: 40 min                       Total In Office Time: 50 min  Modifier needed: No  Episode visit count:  21     Preferred Name:  Messi    SUBJECTIVE     Reports that he notices improvement in his finger motion.     OBJECTIVE     Functional Outcome Measures: Quick Dash  51 score=   90.9 % functional deficit  Functional Outcome Measures Progress Note 5/13/25: QuickDASH 48 score= 84% functional deficit   Functional Outcome Measure Progress Note 6/3025: QuickDASH  47 score= 81.8% functional deficit     Digital AROM:  IE RF SF SF  6/30/25 SF  7/21/25   AROM    MCP 54°  45°  80 80   AROM      °  -50/75°  88 90   AROM      DIP 40°  30°  55 60     60/50 wrist extension/flexion 7/16/25    Strength (psi): RIGHT  4/21/25 LEFT  4/21/25 RIGHT  5/5/25 RIGHT  5/13/25 RIGHT  6/9/25 RIGHT  6/30/25 RIGHT  7/21/25    Position II 49 89 52.6 38.8 51 50 72      Treatment:    Fluidotherapy (67864) Therapist directed exercise in Fluidotherapy to right hand/wrist for preparation for tx and desensitization     Therapeutic exercise (39439) x 40 min:  PROM (extensive) MP-IP RF and SF  Composite PROM RF (extensive)   Distractions with oscillations,

## 2025-07-30 ENCOUNTER — TREATMENT (OUTPATIENT)
Age: 55
End: 2025-07-30

## 2025-07-30 DIAGNOSIS — M25.60 DECREASED RANGE OF MOTION: Primary | ICD-10-CM

## 2025-07-30 DIAGNOSIS — Z78.9 DECREASED ACTIVITIES OF DAILY LIVING (ADL): ICD-10-CM

## 2025-07-30 DIAGNOSIS — S62.316A DISPLACED FRACTURE OF BASE OF FIFTH METACARPAL BONE, RIGHT HAND, INITIAL ENCOUNTER FOR CLOSED FRACTURE: ICD-10-CM

## 2025-07-30 DIAGNOSIS — M25.641 STIFFNESS OF FINGER JOINT OF RIGHT HAND: ICD-10-CM

## 2025-07-30 PROCEDURE — 97010 HOT OR COLD PACKS THERAPY: CPT | Performed by: OCCUPATIONAL THERAPIST

## 2025-07-30 NOTE — PROGRESS NOTES
[facility]  [address] [place] [phone]  Dept: [phone]      Occupational Therapy Daily Note     Referring MD: Bryson Temple MD    Diagnosis:     ICD-10-CM    1. Decreased range of motion  M25.60       2. Stiffness of finger joint of right hand  M25.641       3. Decreased activities of daily living (ADL)  Z78.9       4. Displaced fracture of base of fifth metacarpal bone, right hand, initial encounter for closed fracture [S62.316A]  S62.316A       Surgery: Date Right Ultrasound-Guided Carpal Tunnel release, right hand hardware removal, right 5th MCP capsulectomy, right small finger and DIP metacarpal complex tenolysis with removal of adhesions from the collateral ligaments and the extensor tian 4/8/25     Therapy precautions: None    Payor: Payor: [employer] /  /  /  Billing pattern: Workers comp- substantial/midpoint time each CPT  Total Direct Treatment Time: 40 min                       Total In Office Time: 50 min  Modifier needed: No  Episode visit count:  22     Preferred Name:  Messi    SUBJECTIVE     Pt reports he still has rotation at the small finger but he is using his hand well.     OBJECTIVE     Functional Outcome Measures: Quick Dash  51 score=   90.9 % functional deficit  Functional Outcome Measures Progress Note 5/13/25: QuickDASH 48 score= 84% functional deficit   Functional Outcome Measure Progress Note 6/3025: QuickDASH  47 score= 81.8% functional deficit     Digital AROM:  IE RF SF SF  6/30/25 SF  7/21/25   AROM    MCP 54°  45°  80 80   AROM      °  -50/75°  88 90   AROM      DIP 40°  30°  55 60     60/50 wrist extension/flexion 7/16/25    Strength (psi): RIGHT  4/21/25 LEFT  4/21/25 RIGHT  5/5/25 RIGHT  5/13/25 RIGHT  6/9/25 RIGHT  6/30/25 RIGHT  7/21/25    Position II 49 89 52.6 38.8 51 50 72      Treatment:     Hot Pack (46153) applied to R hand  to increase tissue extensibility, relax tight muscles, and/or decrease pain. Skin

## 2025-08-06 ENCOUNTER — TREATMENT (OUTPATIENT)
Age: 55
End: 2025-08-06
Payer: COMMERCIAL

## 2025-08-06 DIAGNOSIS — S62.617A DISPLACED FRACTURE OF PROXIMAL PHALANX OF LEFT LITTLE FINGER, INITIAL ENCOUNTER FOR CLOSED FRACTURE: ICD-10-CM

## 2025-08-06 DIAGNOSIS — Z78.9 DECREASED ACTIVITIES OF DAILY LIVING (ADL): ICD-10-CM

## 2025-08-06 DIAGNOSIS — M25.60 DECREASED RANGE OF MOTION: Primary | ICD-10-CM

## 2025-08-06 DIAGNOSIS — M25.641 STIFFNESS OF FINGER JOINT OF RIGHT HAND: ICD-10-CM

## 2025-08-06 PROCEDURE — 97110 THERAPEUTIC EXERCISES: CPT | Performed by: OCCUPATIONAL THERAPIST

## 2025-08-06 PROCEDURE — 97010 HOT OR COLD PACKS THERAPY: CPT | Performed by: OCCUPATIONAL THERAPIST

## 2025-08-11 ENCOUNTER — TREATMENT (OUTPATIENT)
Age: 55
End: 2025-08-11
Payer: COMMERCIAL

## 2025-08-11 ENCOUNTER — OFFICE VISIT (OUTPATIENT)
Age: 55
End: 2025-08-11
Payer: COMMERCIAL

## 2025-08-11 DIAGNOSIS — M25.60 DECREASED RANGE OF MOTION: Primary | ICD-10-CM

## 2025-08-11 DIAGNOSIS — S62.616A CLOSED DISPLACED FRACTURE OF PROXIMAL PHALANX OF RIGHT LITTLE FINGER, INITIAL ENCOUNTER: ICD-10-CM

## 2025-08-11 DIAGNOSIS — Z78.9 DECREASED ACTIVITIES OF DAILY LIVING (ADL): ICD-10-CM

## 2025-08-11 DIAGNOSIS — M25.641 STIFFNESS OF FINGER JOINT OF RIGHT HAND: ICD-10-CM

## 2025-08-11 DIAGNOSIS — S62.316A DISPLACED FRACTURE OF BASE OF FIFTH METACARPAL BONE, RIGHT HAND, INITIAL ENCOUNTER FOR CLOSED FRACTURE: Primary | ICD-10-CM

## 2025-08-11 PROCEDURE — 97110 THERAPEUTIC EXERCISES: CPT | Performed by: OCCUPATIONAL THERAPIST

## 2025-08-11 PROCEDURE — 97022 WHIRLPOOL THERAPY: CPT | Performed by: OCCUPATIONAL THERAPIST

## 2025-08-11 PROCEDURE — 99213 OFFICE O/P EST LOW 20 MIN: CPT | Performed by: ORTHOPAEDIC SURGERY

## 2025-08-25 ENCOUNTER — TREATMENT (OUTPATIENT)
Age: 55
End: 2025-08-25
Payer: COMMERCIAL

## 2025-08-25 DIAGNOSIS — Z78.9 DECREASED ACTIVITIES OF DAILY LIVING (ADL): ICD-10-CM

## 2025-08-25 DIAGNOSIS — M25.641 STIFFNESS OF FINGER JOINT OF RIGHT HAND: ICD-10-CM

## 2025-08-25 DIAGNOSIS — S62.617A DISPLACED FRACTURE OF PROXIMAL PHALANX OF LEFT LITTLE FINGER, INITIAL ENCOUNTER FOR CLOSED FRACTURE: ICD-10-CM

## 2025-08-25 DIAGNOSIS — M25.60 DECREASED RANGE OF MOTION: Primary | ICD-10-CM

## 2025-08-25 PROCEDURE — 97110 THERAPEUTIC EXERCISES: CPT | Performed by: OCCUPATIONAL THERAPIST

## 2025-08-25 PROCEDURE — 97010 HOT OR COLD PACKS THERAPY: CPT | Performed by: OCCUPATIONAL THERAPIST

## 2025-08-27 DIAGNOSIS — G56.01 RIGHT CARPAL TUNNEL SYNDROME: ICD-10-CM

## 2025-08-27 DIAGNOSIS — S62.616A CLOSED DISPLACED FRACTURE OF PROXIMAL PHALANX OF RIGHT LITTLE FINGER, INITIAL ENCOUNTER: ICD-10-CM

## 2025-08-27 DIAGNOSIS — S62.316A DISPLACED FRACTURE OF BASE OF FIFTH METACARPAL BONE, RIGHT HAND, INITIAL ENCOUNTER FOR CLOSED FRACTURE: Primary | ICD-10-CM

## 2025-09-04 ENCOUNTER — TELEPHONE (OUTPATIENT)
Age: 55
End: 2025-09-04

## (undated) DEVICE — SOLUTION IRRIG 1000ML 0.9% SOD CHL USP POUR PLAS BTL

## (undated) DEVICE — HAND PACK: Brand: MEDLINE INDUSTRIES, INC.

## (undated) DEVICE — BIT DRL L74/57MM DIA1.5MM MINI QUIK CPL FOR 2MM SCR PILOT

## (undated) DEVICE — BNDG,ELSTC,MATRIX,STRL,3"X5YD,LF,HOOK&LP: Brand: MEDLINE

## (undated) DEVICE — GLOVE ORANGE PI 7 1/2   MSG9075

## (undated) DEVICE — GUIDEWIRE ORTHO 1.1X150 MM TROCAR PT 1 END

## (undated) DEVICE — SPLINT ORTH W5XL30IN PLSTR OF PARIS LO EXOTHERM SMOOTH

## (undated) DEVICE — PADDING CAST W3INXL4YD COT BLEND MIC PLEAT UNDERCAST SPEC

## (undated) DEVICE — SCREW BNE L14MM DIA2MM CORT S STL ST LOK FULL THRD T6
Type: IMPLANTABLE DEVICE | Site: HAND | Status: NON-FUNCTIONAL
Removed: 2024-04-04

## (undated) DEVICE — C-ARM: Brand: UNBRANDED

## (undated) DEVICE — SUTURE N ABSRB MONOFILAMENT 4-0 PS5 18 IN BLU PROLENE 8656G

## (undated) DEVICE — GOWN,SIRUS,NONRNF,SETINSLV,XL,20/CS: Brand: MEDLINE

## (undated) DEVICE — BNDG,ELSTC,MATRIX,STRL,2"X5YD,LF,HOOK&LP: Brand: MEDLINE

## (undated) DEVICE — DISPOSABLE BIPOLAR CODE, 12' (3.66 M): Brand: CONMED